# Patient Record
Sex: FEMALE | Race: WHITE | Employment: OTHER
[De-identification: names, ages, dates, MRNs, and addresses within clinical notes are randomized per-mention and may not be internally consistent; named-entity substitution may affect disease eponyms.]

---

## 2018-01-21 ENCOUNTER — HEALTH MAINTENANCE LETTER (OUTPATIENT)
Age: 58
End: 2018-01-21

## 2019-08-15 ENCOUNTER — TELEPHONE (OUTPATIENT)
Dept: DERMATOLOGY | Facility: CLINIC | Age: 59
End: 2019-08-15

## 2019-08-15 NOTE — TELEPHONE ENCOUNTER
Left message for patient advised to call [phone] for previsit questions upcoming appointment on 8/22/19 at 1:15pm.        1. Have you ever seen a dermatologist outside of the [facility] system or [place]?  If so, we need records  2. Any personal history of skin cancer?          Shilpa

## 2019-08-22 ENCOUNTER — OFFICE VISIT (OUTPATIENT)
Dept: PSYCHOLOGY | Facility: CLINIC | Age: 59
End: 2019-08-22

## 2019-08-22 ENCOUNTER — OFFICE VISIT (OUTPATIENT)
Dept: DERMATOLOGY | Facility: CLINIC | Age: 59
End: 2019-08-22
Payer: COMMERCIAL

## 2019-08-22 ENCOUNTER — PATIENT OUTREACH (OUTPATIENT)
Dept: CARE COORDINATION | Facility: CLINIC | Age: 59
End: 2019-08-22

## 2019-08-22 DIAGNOSIS — D23.9 DILATED PORE OF WINER: ICD-10-CM

## 2019-08-22 DIAGNOSIS — Z85.828 HISTORY OF NONMELANOMA SKIN CANCER: Primary | ICD-10-CM

## 2019-08-22 DIAGNOSIS — D22.9 RECURRENT NEVUS: ICD-10-CM

## 2019-08-22 DIAGNOSIS — L81.4 SOLAR LENTIGO: ICD-10-CM

## 2019-08-22 DIAGNOSIS — L57.8 SUN-DAMAGED SKIN: ICD-10-CM

## 2019-08-22 DIAGNOSIS — F43.20 ADJUSTMENT DISORDER: Primary | ICD-10-CM

## 2019-08-22 DIAGNOSIS — D18.01 CHERRY ANGIOMA: ICD-10-CM

## 2019-08-22 DIAGNOSIS — D22.9 MULTIPLE BENIGN NEVI: ICD-10-CM

## 2019-08-22 DIAGNOSIS — L82.1 SEBORRHEIC KERATOSIS: ICD-10-CM

## 2019-08-22 PROBLEM — G89.4 CHRONIC PAIN DISORDER: Status: ACTIVE | Noted: 2017-10-16

## 2019-08-22 PROBLEM — G56.01 CARPAL TUNNEL SYNDROME OF RIGHT WRIST: Status: ACTIVE | Noted: 2017-02-22

## 2019-08-22 PROBLEM — M18.11 PRIMARY OSTEOARTHRITIS OF FIRST CARPOMETACARPAL JOINT OF RIGHT HAND: Status: ACTIVE | Noted: 2017-02-06

## 2019-08-22 PROBLEM — I73.00 RAYNAUD'S DISEASE WITHOUT GANGRENE: Status: ACTIVE | Noted: 2017-02-06

## 2019-08-22 PROCEDURE — 99203 OFFICE O/P NEW LOW 30 MIN: CPT | Performed by: DERMATOLOGY

## 2019-08-22 PROCEDURE — 99207 ZZC NO CHARGE BEHAVIORAL WARM HANDOFF: CPT | Performed by: PSYCHOLOGIST

## 2019-08-22 RX ORDER — METHOCARBAMOL 750 MG/1
750 TABLET, FILM COATED ORAL
COMMUNITY
Start: 2019-07-28 | End: 2020-01-24

## 2019-08-22 ASSESSMENT — PAIN SCALES - GENERAL: PAINLEVEL: NO PAIN (0)

## 2019-08-22 NOTE — PROGRESS NOTES
Patient had appointment with her specialty provider, Dr. Wells. [facility] services were offered. No immediate safety/risk issues were reported or identified. Explained the role of the [facility] and provided contact information for the [facility]. Patient reported she was living in a shelter and expressed distress about finances, housing, and her medical health. She expressed feeling like she is not given help through the [facility] and is unsure how to proceed. She was connected with JASON Ewing to assist with evaluating possible resources.       Marcell Morejon PsyD,   Behavioral Health Clinician

## 2019-08-22 NOTE — PROGRESS NOTES
Social Work Intervention  [facility]    Data/Intervention: 19    Patient Name:  Nicole Franco  /Age:  1960 (58 year old)    Visit Type: in person  Referral Source: Marcell Morejon,   Reason for Referral:  Support with housing and financial    Collaborated With:    -Patient      Patient Concerns/Issues:   Writer connected with patient via referral from Behavioral Health following a dermatology appointment regarding concern with safety, housing and financial.     Met with Nicole following a dermatology appointment. Pt shared that she is currently staying at [facility] in [place].     Nicole shared that she has been staying at [facility] for almost two weeks and is in the process of attaining a restraining order. She has connected with the [facility] with the support of staff at the shelter and is receiving medical, financial and food support. Due to several medical diagnosis, Fibromyalgia, Depression and Anxiety she is not longer working. Nicole stated that she was a , however approximately four months ago closed out with her last client due to physical limitations. She stated that she has filed for SSDI within the last month and currently awaiting results.    Writer provided supportive listening and counseling. Patient is well connected with [facility] resources and ongoing support from [facility]. Provided writer contact information and encouraged her to connect with any additional concerns or questions. Sw will continue to assist as needed.               JASON Ewing, [facility]    [facility]  [phone]  [email or web address]
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
## 2019-08-22 NOTE — NURSING NOTE
Nicole Franco's goals for this visit include:   Chief Complaint   Patient presents with     Skin Check     Personal hx of SC, mother with hx of Melanoma and father NMSC. Not immunosuppressed. Areas of concern: mole on back.       She requests these members of her care team be copied on today's visit information: No    PCP: Yady Almazan    Referring Provider:  No referring provider defined for this encounter.    There were no vitals taken for this visit.    Do you need any medication refills at today's visit? No    Maryann Bay LPN

## 2019-08-22 NOTE — Clinical Note
LORI, I got her in touch with social work. Let me know if there is anything else I can assist with for this patient.Marcell
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
## 2019-09-30 ENCOUNTER — DOCUMENTATION ONLY (OUTPATIENT)
Dept: DERMATOLOGY | Facility: CLINIC | Age: 59
End: 2019-09-30

## 2019-09-30 NOTE — PROGRESS NOTES
Dermatology Problem List:  1. History of Dysplastic Nevus/Atypical Nevus  -Lentiginous junctional melanocytic lesion with mild atypia, right anterior neck,  - 2011, Exc. 6-6-2011  -Lentiginous junctional melanocytic lesion with moderate atypia, proximal to right medial ankle - 2011, clear margins with biopsy  -Junctional nevus with atypia (dysplastic), right lower chest - 2010  -Junctional nevus with atypia, right lateral proximal arm (dysplastic) - 2009  -Compound nevus with atypia (dysplastic), lef mid scapula - 2009  -Dysplastic nevus, medial to left scapula - 2006  -Compound nevus with atypia(dysplastic), upper spine left superior - 2006  -Dysplastic nevus, mild dysplasia, Below L Breast - 2004  -Junctional melanocytic lesion, L Mitchell - 2000  2. *Clinically monitor  - left mid abdomen, recurrent nevus within biopsy scar.   3. Family history of melanoma in her mother        Susana Mccormack, CMA

## 2020-01-24 ENCOUNTER — PATIENT OUTREACH (OUTPATIENT)
Dept: FAMILY MEDICINE | Facility: CLINIC | Age: 60
End: 2020-01-24

## 2020-01-24 ENCOUNTER — OFFICE VISIT (OUTPATIENT)
Dept: FAMILY MEDICINE | Facility: CLINIC | Age: 60
End: 2020-01-24
Payer: COMMERCIAL

## 2020-01-24 VITALS
OXYGEN SATURATION: 93 % | HEIGHT: 66 IN | SYSTOLIC BLOOD PRESSURE: 130 MMHG | WEIGHT: 164.1 LBS | TEMPERATURE: 96.6 F | RESPIRATION RATE: 18 BRPM | DIASTOLIC BLOOD PRESSURE: 74 MMHG | BODY MASS INDEX: 26.37 KG/M2 | HEART RATE: 87 BPM

## 2020-01-24 DIAGNOSIS — G89.29 OTHER CHRONIC PAIN: ICD-10-CM

## 2020-01-24 DIAGNOSIS — M79.7 FIBROMYALGIA: ICD-10-CM

## 2020-01-24 DIAGNOSIS — E78.5 HYPERLIPIDEMIA LDL GOAL <100: ICD-10-CM

## 2020-01-24 DIAGNOSIS — Z76.89 ENCOUNTER TO ESTABLISH CARE: Primary | ICD-10-CM

## 2020-01-24 DIAGNOSIS — F41.8 DEPRESSION WITH ANXIETY: ICD-10-CM

## 2020-01-24 DIAGNOSIS — F51.01 PRIMARY INSOMNIA: ICD-10-CM

## 2020-01-24 DIAGNOSIS — I10 ESSENTIAL HYPERTENSION: ICD-10-CM

## 2020-01-24 DIAGNOSIS — K21.9 GASTROESOPHAGEAL REFLUX DISEASE WITHOUT ESOPHAGITIS: ICD-10-CM

## 2020-01-24 DIAGNOSIS — Z12.11 SPECIAL SCREENING FOR MALIGNANT NEOPLASMS, COLON: ICD-10-CM

## 2020-01-24 DIAGNOSIS — Z59.00 HOMELESS: ICD-10-CM

## 2020-01-24 PROCEDURE — 99215 OFFICE O/P EST HI 40 MIN: CPT | Performed by: NURSE PRACTITIONER

## 2020-01-24 RX ORDER — SERTRALINE HYDROCHLORIDE 100 MG/1
150 TABLET, FILM COATED ORAL DAILY
Qty: 45 TABLET | Refills: 3 | Status: SHIPPED | OUTPATIENT
Start: 2020-01-24 | End: 2020-04-13

## 2020-01-24 RX ORDER — HYDROXYZINE HYDROCHLORIDE 25 MG/1
25 TABLET, FILM COATED ORAL DAILY
Qty: 30 TABLET | Refills: 3 | Status: SHIPPED | OUTPATIENT
Start: 2020-01-24 | End: 2020-04-13

## 2020-01-24 RX ORDER — METHOCARBAMOL 750 MG/1
750 TABLET, FILM COATED ORAL 3 TIMES DAILY
Qty: 90 TABLET | Refills: 3 | Status: SHIPPED | OUTPATIENT
Start: 2020-01-24 | End: 2020-04-13

## 2020-01-24 RX ORDER — LISINOPRIL AND HYDROCHLOROTHIAZIDE 20; 25 MG/1; MG/1
1 TABLET ORAL DAILY
Qty: 30 TABLET | Refills: 3 | Status: SHIPPED | OUTPATIENT
Start: 2020-01-24 | End: 2020-04-13

## 2020-01-24 RX ORDER — GEMFIBROZIL 600 MG/1
600 TABLET, FILM COATED ORAL
Qty: 60 TABLET | Refills: 3 | Status: SHIPPED | OUTPATIENT
Start: 2020-01-24 | End: 2020-04-13

## 2020-01-24 RX ORDER — TEMAZEPAM 30 MG
30 CAPSULE ORAL
Qty: 30 CAPSULE | Refills: 3 | Status: SHIPPED | OUTPATIENT
Start: 2020-01-24 | End: 2020-04-13

## 2020-01-24 RX ORDER — IBUPROFEN 600 MG/1
600 TABLET, FILM COATED ORAL 2 TIMES DAILY
Qty: 60 TABLET | Refills: 3 | Status: SHIPPED | OUTPATIENT
Start: 2020-01-24 | End: 2020-04-13

## 2020-01-24 RX ORDER — BUSPIRONE HYDROCHLORIDE 10 MG/1
10 TABLET ORAL 3 TIMES DAILY
Qty: 90 TABLET | Refills: 3 | Status: SHIPPED | OUTPATIENT
Start: 2020-01-24 | End: 2020-04-13

## 2020-01-24 RX ORDER — GABAPENTIN 300 MG/1
300 CAPSULE ORAL 2 TIMES DAILY
Qty: 60 CAPSULE | Refills: 3 | Status: SHIPPED | OUTPATIENT
Start: 2020-01-24 | End: 2020-04-07

## 2020-01-24 SDOH — ECONOMIC STABILITY - HOUSING INSECURITY: HOMELESSNESS UNSPECIFIED: Z59.00

## 2020-01-24 ASSESSMENT — PAIN SCALES - GENERAL: PAINLEVEL: SEVERE PAIN (6)

## 2020-01-24 ASSESSMENT — PATIENT HEALTH QUESTIONNAIRE - PHQ9
10. IF YOU CHECKED OFF ANY PROBLEMS, HOW DIFFICULT HAVE THESE PROBLEMS MADE IT FOR YOU TO DO YOUR WORK, TAKE CARE OF THINGS AT HOME, OR GET ALONG WITH OTHER PEOPLE: VERY DIFFICULT
SUM OF ALL RESPONSES TO PHQ QUESTIONS 1-9: 16
SUM OF ALL RESPONSES TO PHQ QUESTIONS 1-9: 16

## 2020-01-24 ASSESSMENT — MIFFLIN-ST. JEOR: SCORE: 1327.13

## 2020-01-24 NOTE — PATIENT INSTRUCTIONS
Refills now.     Care Coordination referral to look community resources and resources for medication.     See me in 2 months to reassess living situation and determine if she is ready for pain referral to our pain Team.     Odilon Fuentes CNP
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
## 2020-01-25 ASSESSMENT — PATIENT HEALTH QUESTIONNAIRE - PHQ9: SUM OF ALL RESPONSES TO PHQ QUESTIONS 1-9: 16

## 2020-01-27 ENCOUNTER — PATIENT OUTREACH (OUTPATIENT)
Dept: FAMILY MEDICINE | Facility: CLINIC | Age: 60
End: 2020-01-27

## 2020-01-27 NOTE — PROGRESS NOTES
The Clinic Community Health Worker spoke with  the patient today to discuss possible Clinic Care Coordination enrollment.  The service was described to the patient and immediate needs were discussed.  The patient agreed to enrollment and an assessment was scheduled.  The patient was provided with contact information for the clinic CHW.               Assessment date: 1/30/20 at 11am with MAKENNA SOLANO for a phone assessment.     Notes:  -Patient is in temporary home in [place]- looking for section 8 housing   -Try to get Social Security Disability  -Help paying for medications  -ARM's worker- getting a counselor to come to home

## 2020-01-27 NOTE — LETTER
1/27/20          Dear Nicole,                                                                         You were recently referred to the [facility]'s Clinic Care Coordination service.  This is a service offered through your Primary Care Clinic which can help you access resources, services in regard to your health and well-being goals. The clinic Community Health Worker has placed two calls to you to discuss the nature of this service and to offer enrollment.  If you are interested in learning more about Clinic Care Coordination, please call your Primary Care Clinic's Community Health Worker, Yen HERNANDEZ, at [phone].                                                    Sincerely,                                                                              PIOTR Barlow                                                                                          Clinic Care Coordination                                                  [facility]

## 2020-01-29 ENCOUNTER — TELEPHONE (OUTPATIENT)
Dept: FAMILY MEDICINE | Facility: CLINIC | Age: 60
End: 2020-01-29

## 2020-01-29 NOTE — TELEPHONE ENCOUNTER
Left message for patient to return call to schedule colonoscopy or EGD. If Disha or Melony are unavailable, please transfer to the surgery center.

## 2020-01-29 NOTE — LETTER
[address] [address]  [phone]        February 3, 2020    Nicole Franco  [id number] [address]  [place] [address]

## 2020-01-29 NOTE — LETTER

## 2020-01-30 ENCOUNTER — PATIENT OUTREACH (OUTPATIENT)
Dept: FAMILY MEDICINE | Facility: OTHER | Age: 60
End: 2020-01-30
Payer: COMMERCIAL

## 2020-01-30 DIAGNOSIS — Z71.89 COUNSELING AND COORDINATION OF CARE: Primary | ICD-10-CM

## 2020-01-30 ASSESSMENT — ACTIVITIES OF DAILY LIVING (ADL): DEPENDENT_IADLS:: INDEPENDENT
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
## 2020-01-31 NOTE — TELEPHONE ENCOUNTER
Left message for patient to return call to schedule EGD/colonoscopy. If Melony or Disha are not available, please transfer to same day surgery        .

## 2020-02-03 NOTE — TELEPHONE ENCOUNTER
Date of colonoscopy/EGD: 2/18/20  Surgeon: Dr. Louis  Prep:Miralax  Packet:Colonoscopy/EGD instructions mailed to patient's home address.   Date: 2/3/2020      Surgery Scheduler

## 2020-02-06 ENCOUNTER — PATIENT OUTREACH (OUTPATIENT)
Dept: CARE COORDINATION | Facility: CLINIC | Age: 60
End: 2020-02-06
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
## 2020-02-17 ENCOUNTER — ANESTHESIA EVENT (OUTPATIENT)
Dept: GASTROENTEROLOGY | Facility: CLINIC | Age: 60
End: 2020-02-17
Payer: COMMERCIAL

## 2020-02-17 ASSESSMENT — LIFESTYLE VARIABLES: TOBACCO_USE: 1

## 2020-02-18 ENCOUNTER — ANESTHESIA (OUTPATIENT)
Dept: GASTROENTEROLOGY | Facility: CLINIC | Age: 60
End: 2020-02-18
Payer: COMMERCIAL

## 2020-02-18 ENCOUNTER — HOSPITAL ENCOUNTER (OUTPATIENT)
Facility: CLINIC | Age: 60
Discharge: HOME | End: 2020-02-18
Attending: SURGERY | Admitting: SURGERY
Payer: COMMERCIAL

## 2020-02-18 VITALS
OXYGEN SATURATION: 99 % | HEART RATE: 66 BPM | RESPIRATION RATE: 16 BRPM | SYSTOLIC BLOOD PRESSURE: 123 MMHG | DIASTOLIC BLOOD PRESSURE: 86 MMHG | TEMPERATURE: 96.7 F

## 2020-02-18 LAB — COLONOSCOPY: NORMAL

## 2020-02-18 PROCEDURE — 25000132 ZZH RX MED GY IP 250 OP 250 PS 637: Performed by: SURGERY

## 2020-02-18 PROCEDURE — 25000128 H RX IP 250 OP 636: Performed by: NURSE ANESTHETIST, CERTIFIED REGISTERED

## 2020-02-18 PROCEDURE — 25000125 ZZHC RX 250: Performed by: SURGERY

## 2020-02-18 PROCEDURE — 37000009 ZZH ANESTHESIA TECHNICAL FEE, EACH ADDTL 15 MIN: Performed by: SURGERY

## 2020-02-18 PROCEDURE — G0121 COLON CA SCRN NOT HI RSK IND: HCPCS | Performed by: SURGERY

## 2020-02-18 PROCEDURE — 25800030 ZZH RX IP 258 OP 636: Performed by: SURGERY

## 2020-02-18 PROCEDURE — 25000125 ZZHC RX 250: Performed by: NURSE ANESTHETIST, CERTIFIED REGISTERED

## 2020-02-18 PROCEDURE — 37000008 ZZH ANESTHESIA TECHNICAL FEE, 1ST 30 MIN: Performed by: SURGERY

## 2020-02-18 PROCEDURE — 45378 DIAGNOSTIC COLONOSCOPY: CPT | Performed by: SURGERY

## 2020-02-18 PROCEDURE — G0105 COLORECTAL SCRN; HI RISK IND: HCPCS | Performed by: SURGERY

## 2020-02-18 RX ORDER — ONDANSETRON 2 MG/ML
4 INJECTION INTRAMUSCULAR; INTRAVENOUS EVERY 6 HOURS PRN
Status: CANCELLED | OUTPATIENT
Start: 2020-02-18

## 2020-02-18 RX ORDER — PROPOFOL 10 MG/ML
INJECTION, EMULSION INTRAVENOUS PRN
Status: DISCONTINUED | OUTPATIENT
Start: 2020-02-18 | End: 2020-02-18

## 2020-02-18 RX ORDER — LIDOCAINE HYDROCHLORIDE 20 MG/ML
INJECTION, SOLUTION INFILTRATION; PERINEURAL PRN
Status: DISCONTINUED | OUTPATIENT
Start: 2020-02-18 | End: 2020-02-18

## 2020-02-18 RX ORDER — ONDANSETRON 4 MG/1
4 TABLET, ORALLY DISINTEGRATING ORAL EVERY 6 HOURS PRN
Status: CANCELLED | OUTPATIENT
Start: 2020-02-18

## 2020-02-18 RX ORDER — SODIUM CHLORIDE, SODIUM LACTATE, POTASSIUM CHLORIDE, CALCIUM CHLORIDE 600; 310; 30; 20 MG/100ML; MG/100ML; MG/100ML; MG/100ML
INJECTION, SOLUTION INTRAVENOUS CONTINUOUS
Status: DISCONTINUED | OUTPATIENT
Start: 2020-02-18 | End: 2020-02-18 | Stop reason: HOSPADM

## 2020-02-18 RX ORDER — NALOXONE HYDROCHLORIDE 0.4 MG/ML
.1-.4 INJECTION, SOLUTION INTRAMUSCULAR; INTRAVENOUS; SUBCUTANEOUS
Status: CANCELLED | OUTPATIENT
Start: 2020-02-18 | End: 2020-02-19

## 2020-02-18 RX ORDER — ONDANSETRON 2 MG/ML
4 INJECTION INTRAMUSCULAR; INTRAVENOUS
Status: DISCONTINUED | OUTPATIENT
Start: 2020-02-18 | End: 2020-02-18 | Stop reason: HOSPADM

## 2020-02-18 RX ORDER — PROPOFOL 10 MG/ML
INJECTION, EMULSION INTRAVENOUS CONTINUOUS PRN
Status: DISCONTINUED | OUTPATIENT
Start: 2020-02-18 | End: 2020-02-18

## 2020-02-18 RX ORDER — FLUMAZENIL 0.1 MG/ML
0.2 INJECTION, SOLUTION INTRAVENOUS
Status: CANCELLED | OUTPATIENT
Start: 2020-02-18 | End: 2020-02-18

## 2020-02-18 RX ORDER — LIDOCAINE 40 MG/G
CREAM TOPICAL
Status: DISCONTINUED | OUTPATIENT
Start: 2020-02-18 | End: 2020-02-18 | Stop reason: HOSPADM

## 2020-02-18 RX ADMIN — LIDOCAINE HYDROCHLORIDE 60 MG: 20 INJECTION, SOLUTION INFILTRATION; PERINEURAL at 11:04

## 2020-02-18 RX ADMIN — SODIUM CHLORIDE, POTASSIUM CHLORIDE, SODIUM LACTATE AND CALCIUM CHLORIDE: 600; 310; 30; 20 INJECTION, SOLUTION INTRAVENOUS at 10:52

## 2020-02-18 RX ADMIN — PROPOFOL 100 MG: 10 INJECTION, EMULSION INTRAVENOUS at 11:04

## 2020-02-18 RX ADMIN — LIDOCAINE HYDROCHLORIDE 1 ML: 10 INJECTION, SOLUTION EPIDURAL; INFILTRATION; INTRACAUDAL; PERINEURAL at 10:44

## 2020-02-18 RX ADMIN — PROPOFOL 150 MCG/KG/MIN: 10 INJECTION, EMULSION INTRAVENOUS at 11:04

## 2020-02-18 NOTE — ANESTHESIA CARE TRANSFER NOTE
Patient: Nicole Franco    Procedure(s):  Colonoscopy    Diagnosis: Special screening for malignant neoplasms, colon [Z12.11]  Diagnosis Additional Information: No value filed.    Anesthesia Type:   MAC     Note:  Airway :Room Air  Patient transferred to:Phase II  Handoff Report: Identifed the Patient, Identified the Reponsible Provider, Reviewed the pertinent medical history, Discussed the surgical course, Reviewed Intra-OP anesthesia mangement and issues during anesthesia, Set expectations for post-procedure period and Allowed opportunity for questions and acknowledgement of understanding      Vitals: (Last set prior to Anesthesia Care Transfer)    CRNA VITALS  2/18/2020 1056 - 2/18/2020 1132      2/18/2020             Resp Rate (observed):  (!) 5                Electronically Signed By: BULL Hernandez CRNA  February 18, 2020  11:32 AM

## 2020-02-18 NOTE — ANESTHESIA PREPROCEDURE EVALUATION
Anesthesia Pre-Procedure Evaluation    Patient: Nicole Franco   MRN: [id number] : 1960          Preoperative Diagnosis: Special screening for malignant neoplasms, colon [Z12.11]    Procedure(s):  Colonoscopy with possible biopsy and/or polypectomy    Past Medical History:   Diagnosis Date     Anxiety      Breast CA (H)      Hypertension      Skin cancer      Past Surgical History:   Procedure Laterality Date     APPENDECTOMY       MASTECTOMY RADICAL         Anesthesia Evaluation     . Pt has not had prior anesthetic            ROS/MED HX    ENT/Pulmonary:     (+)tobacco use, Current use , . .    Neurologic:  - neg neurologic ROS     Cardiovascular:     (+) hypertension----. : . . . :. . No previous cardiac testing       METS/Exercise Tolerance:     Hematologic:  - neg hematologic  ROS       Musculoskeletal:   (+)  other musculoskeletal- fibromyalgia      GI/Hepatic:     (+) GERD Symptomatic,       Renal/Genitourinary:  - ROS Renal section negative       Endo:  - neg endo ROS       Psychiatric:     (+) psychiatric history anxiety and depression      Infectious Disease:  - neg infectious disease ROS       Malignancy:   (+) Malignancy History of Breast  Breast CA Remission status post Surgery.         Other:    (+) C-spine cleared: N/A, H/O Chronic Pain,                        Physical Exam  Normal systems: cardiovascular and pulmonary    Airway   Mallampati: II  TM distance: >3 FB  Neck ROM: full    Dental     Cardiovascular   Rhythm and rate: regular and normal      Pulmonary    breath sounds clear to auscultation            Lab Results   Component Value Date    WBC 10.5 2015    HGB 15.4 2015    HCT 43.9 2015     2015    CRP <5.0 2011    SED 20 10/08/2012     2015    POTASSIUM 3.8 2015    CHLORIDE 101 2015    CO2 29 2015    BUN 13 2015    CR 0.69 2015     (H) 2015    HEIDI 9.7 2015    TSH 1.47 2011  "      Preop Vitals  BP Readings from Last 3 Encounters:   01/24/20 130/74   04/01/16 (!) 143/100   03/27/16 (!) 153/91    Pulse Readings from Last 3 Encounters:   01/24/20 87   04/01/16 61   03/27/16 72      Resp Readings from Last 3 Encounters:   01/24/20 18   04/01/16 18   03/27/16 18    SpO2 Readings from Last 3 Encounters:   01/24/20 93%   04/01/16 97%   03/27/16 97%      Temp Readings from Last 1 Encounters:   01/24/20 96.6  F (35.9  C) (Temporal)    Ht Readings from Last 1 Encounters:   01/24/20 1.67 m (5' 5.75\")      Wt Readings from Last 1 Encounters:   01/24/20 74.4 kg (164 lb 1.6 oz)    Estimated body mass index is 26.69 kg/m  as calculated from the following:    Height as of 1/24/20: 1.67 m (5' 5.75\").    Weight as of 1/24/20: 74.4 kg (164 lb 1.6 oz).       Anesthesia Plan      History & Physical Review  History and physical reviewed and following examination; no interval change.    ASA Status:  2 .    NPO Status:  > 8 hours    Plan for MAC Reason for MAC:  Difficulty with conscious sedation (QS)         Postoperative Care      Consents  Anesthetic plan, risks, benefits and alternatives discussed with:  Patient.  Use of blood products discussed: No .   .                 BULL Klein CRNA  "

## 2020-02-18 NOTE — DISCHARGE INSTRUCTIONS
[facility]    Home Care Following Endoscopy          Activity:    You have just undergone an endoscopic procedure usually performed with conscious sedation.  Do not work or operate machinery (including a car) for at least 12 hours.      I encourage you to walk and attempt to pass this air as soon as possible.    Diet:    Return to the diet you were on before your procedure but eat lightly for the first 12-24 hours.    Drink plenty of water.    Resume any regular medications unless otherwise advised by your physician.  Please begin any new medication prescribed as a result of your procedure as directed by your physician.     If you had any biopsy or polyp removed please refrain from aspirin or aspirin products for 2 days.  If on Coumadin please restart as instructed by your physician.   Pain:    You may take Tylenol as needed for pain.  Expected Recovery:    You can expect some mild abdominal fullness and/or discomfort due to the air used to inflate your intestinal tract. It is also normal to have a mild sore throat after upper endoscopy.    Call Your Physician if You Have:    After Upper Endoscopy:  o Shoulder, back or chest pain.  o Difficulty breathing or swallowing.  o Vomiting blood.    After Colonoscopy:  o Worsening persisting abdominal pain which is worse with activity.  o Fevers (>101 degrees F), chills or shakes.  o Passage of continued blood with bowel movements.   Any questions or concerns about your recovery, please call [phone] or after hours [phone] Nurse Advice Line.    Follow-up Care:    You should receive a call or letter with your results within 1 week. Please call if you have not received a notification of your results.  If asked to return to clinic please make an appointment 1 week after your procedure.  Call [phone].

## 2020-02-18 NOTE — ANESTHESIA POSTPROCEDURE EVALUATION
Patient: Nicole Franco    Procedure(s):  Colonoscopy    Diagnosis:Special screening for malignant neoplasms, colon [Z12.11]  Diagnosis Additional Information: No value filed.    Anesthesia Type:  MAC    Note:  Anesthesia Post Evaluation    Patient location during evaluation: Phase 2 and Bedside  Patient participation: Able to fully participate in evaluation  Level of consciousness: awake and alert  Pain management: satisfactory to patient  Airway patency: patent  Cardiovascular status: stable  Respiratory status: spontaneous ventilation and room air  Hydration status: stable  PONV: none     Anesthetic complications: None    Comments: Appear to tolerate MAC with IV sedation well without anesthesia related problems / complications noted.  Pain level satisfactory per patient. No N  /  V .  No complaints per patient.  Will follow as needed.        Last vitals:  Vitals:    02/18/20 1033   BP: (!) 148/79   Resp: 16   Temp: 96.7  F (35.9  C)   SpO2: 99%         Electronically Signed By: BULL Hernandez CRNA  February 18, 2020  11:33 AM

## 2020-03-27 ENCOUNTER — TELEPHONE (OUTPATIENT)
Dept: FAMILY MEDICINE | Facility: CLINIC | Age: 60
End: 2020-03-27

## 2020-03-27 DIAGNOSIS — M50.30 DDD (DEGENERATIVE DISC DISEASE), CERVICAL: Primary | ICD-10-CM

## 2020-03-27 NOTE — TELEPHONE ENCOUNTER
Reason for call:  Other   Patient called regarding (reason for call): call back  Additional comments: Patient is looking to discuss a prescription for a walker    Phone number to reach patient:  Home number on file [phone] (home)    Best Time:  any    Can we leave a detailed message on this number?  YES    Travel screening: Negative

## 2020-03-30 ENCOUNTER — TELEPHONE (OUTPATIENT)
Dept: FAMILY MEDICINE | Facility: CLINIC | Age: 60
End: 2020-03-30

## 2020-03-30 DIAGNOSIS — M50.30 DDD (DEGENERATIVE DISC DISEASE), CERVICAL: ICD-10-CM

## 2020-03-30 NOTE — TELEPHONE ENCOUNTER
Order updated for 4 wheeled walker w/seat & brakes.    Needs to signed and faxed to  [facility] [phone].      Gage Carrizales [facility], [facility] [phone]

## 2020-04-07 DIAGNOSIS — G89.29 OTHER CHRONIC PAIN: ICD-10-CM

## 2020-04-07 RX ORDER — GABAPENTIN 300 MG/1
300 CAPSULE ORAL 2 TIMES DAILY
Qty: 60 CAPSULE | Refills: 3 | Status: SHIPPED | OUTPATIENT
Start: 2020-04-07 | End: 2020-04-13

## 2020-04-07 NOTE — TELEPHONE ENCOUNTER
Reason for call:  Medication   If this is a refill request, has the caller requested the refill from the pharmacy already? Yes  Will the patient be using a [facility]? N/A  Name of the pharmacy and phone number for the current request:    [id number] - [place], [place] - [address]      Name of the medication requested:      gabapentin (NEURONTIN) 300 MG capsule ()         Other request: Patient says this prescription is supposed to be doubled the MG amount. They're unsure why the dosage changed. They would like a refill on this because they are currently low.     Phone number to reach patient:  Cell number on file:    Telephone Information:   Mobile [phone]           Best Time:  Late morning, not     Can we leave a detailed message on this number? YES    Travel screening: Negative

## 2020-04-13 ENCOUNTER — VIRTUAL VISIT (OUTPATIENT)
Dept: FAMILY MEDICINE | Facility: CLINIC | Age: 60
End: 2020-04-13
Payer: COMMERCIAL

## 2020-04-13 DIAGNOSIS — G89.29 OTHER CHRONIC PAIN: ICD-10-CM

## 2020-04-13 DIAGNOSIS — K21.9 GASTROESOPHAGEAL REFLUX DISEASE WITHOUT ESOPHAGITIS: ICD-10-CM

## 2020-04-13 DIAGNOSIS — N39.41 URGENCY INCONTINENCE: ICD-10-CM

## 2020-04-13 DIAGNOSIS — F51.01 PRIMARY INSOMNIA: ICD-10-CM

## 2020-04-13 DIAGNOSIS — I10 ESSENTIAL HYPERTENSION: ICD-10-CM

## 2020-04-13 DIAGNOSIS — M79.7 FIBROMYALGIA: Primary | ICD-10-CM

## 2020-04-13 DIAGNOSIS — F41.8 DEPRESSION WITH ANXIETY: ICD-10-CM

## 2020-04-13 DIAGNOSIS — E78.5 HYPERLIPIDEMIA LDL GOAL <100: ICD-10-CM

## 2020-04-13 PROCEDURE — 99442 ZZC PHYSICIAN TELEPHONE EVALUATION 11-20 MIN: CPT | Performed by: NURSE PRACTITIONER

## 2020-04-13 RX ORDER — METHOCARBAMOL 750 MG/1
750 TABLET, FILM COATED ORAL 3 TIMES DAILY
Qty: 90 TABLET | Refills: 3 | Status: SHIPPED | OUTPATIENT
Start: 2020-04-13 | End: 2020-12-31

## 2020-04-13 RX ORDER — TEMAZEPAM 30 MG
30 CAPSULE ORAL
Qty: 30 CAPSULE | Refills: 3 | Status: SHIPPED | OUTPATIENT
Start: 2020-04-13 | End: 2020-07-31

## 2020-04-13 RX ORDER — SERTRALINE HYDROCHLORIDE 100 MG/1
150 TABLET, FILM COATED ORAL DAILY
Qty: 45 TABLET | Refills: 3 | Status: SHIPPED | OUTPATIENT
Start: 2020-04-13 | End: 2020-09-25

## 2020-04-13 RX ORDER — OXYBUTYNIN CHLORIDE 5 MG/1
10 TABLET ORAL 2 TIMES DAILY
Qty: 360 TABLET | Refills: 0 | Status: SHIPPED | OUTPATIENT
Start: 2020-04-13 | End: 2020-07-06

## 2020-04-13 RX ORDER — GABAPENTIN 300 MG/1
1200 CAPSULE ORAL 2 TIMES DAILY
Qty: 720 CAPSULE | Refills: 0 | Status: SHIPPED | OUTPATIENT
Start: 2020-04-13 | End: 2020-07-29

## 2020-04-13 RX ORDER — IBUPROFEN 600 MG/1
600 TABLET, FILM COATED ORAL 2 TIMES DAILY
Qty: 60 TABLET | Refills: 3 | Status: SHIPPED | OUTPATIENT
Start: 2020-04-13 | End: 2021-01-20

## 2020-04-13 RX ORDER — HYDROXYZINE HYDROCHLORIDE 25 MG/1
25 TABLET, FILM COATED ORAL DAILY
Qty: 30 TABLET | Refills: 3 | Status: SHIPPED | OUTPATIENT
Start: 2020-04-13 | End: 2020-06-26

## 2020-04-13 RX ORDER — GEMFIBROZIL 600 MG/1
600 TABLET, FILM COATED ORAL
Qty: 60 TABLET | Refills: 3 | Status: SHIPPED | OUTPATIENT
Start: 2020-04-13 | End: 2021-01-13

## 2020-04-13 RX ORDER — BUSPIRONE HYDROCHLORIDE 10 MG/1
10 TABLET ORAL 3 TIMES DAILY
Qty: 90 TABLET | Refills: 3 | Status: SHIPPED | OUTPATIENT
Start: 2020-04-13 | End: 2020-12-22

## 2020-04-13 RX ORDER — LISINOPRIL AND HYDROCHLOROTHIAZIDE 20; 25 MG/1; MG/1
1 TABLET ORAL DAILY
Qty: 30 TABLET | Refills: 3 | Status: SHIPPED | OUTPATIENT
Start: 2020-04-13 | End: 2020-10-21

## 2020-04-13 ASSESSMENT — PAIN SCALES - GENERAL: PAINLEVEL: NO PAIN (0)

## 2020-04-13 NOTE — PROGRESS NOTES
"Nicole Franco is a 59 year old female who is being evaluated via a billable telephone visit.      The patient has been notified of following:     \"This telephone visit will be conducted via a call between you and your physician/provider. We have found that certain health care needs can be provided without the need for a physical exam.  This service lets us provide the care you need with a short phone conversation.  If a prescription is necessary we can send it directly to your pharmacy.  If lab work is needed we can place an order for that and you can then stop by our lab to have the test done at a later time.    Telephone visits are billed at different rates depending on your insurance coverage. During this emergency period, for some insurers they may be billed the same as an in-person visit.  Please reach out to your insurance provider with any questions.    If during the course of the call the physician/provider feels a telephone visit is not appropriate, you will not be charged for this service.\"    Patient has given verbal consent for Telephone visit?  Yes    How would you like to obtain your AVS? Mail a copy    Subjective     Nicole Franco is a 59 year old female who presents to clinic today for the following health issues:    Medication Followup of Gabapentin     Taking Medication as prescribed: yes    Side Effects:  None    Medication Helping Symptoms:  yes              Patient Active Problem List   Diagnosis     Abnormal Pap smear of cervix     Anxiety state     Depression with anxiety     Chronic pain disorder     Fibromyalgia     Carpal tunnel syndrome of right wrist     Decreased libido     Degeneration of intervertebral disc     DUB (dysfunctional uterine bleeding)     Dysplastic nevus     Dysthymic disorder     Essential hypertension     Excessive or frequent menstruation     Gastroesophageal reflux disease     Hemorrhoids     Varicose vein of leg     Insomnia     Malignant neoplasm of breast " (H)     Personal history of malignant neoplasm of breast     Primary osteoarthritis of first carpometacarpal joint of right hand     Raynaud's disease without gangrene     Menopause present     Tobacco use disorder     Past Surgical History:   Procedure Laterality Date     APPENDECTOMY       COLONOSCOPY N/A 2/18/2020    Procedure: Colonoscopy;  Surgeon: Dominic Louis DO;  Location: [facility]     MASTECTOMY RADICAL         Social History     Tobacco Use     Smoking status: Current Every Day Smoker     Packs/day: 0.50     Smokeless tobacco: Never Used   Substance Use Topics     Alcohol use: Yes     Alcohol/week: 1.7 standard drinks     Types: 2 Shots of liquor per week     Comment: occ     No family history on file.      Current Outpatient Medications   Medication Sig Dispense Refill     ascorbic acid (VITAMIN C) 500 MG tablet Take 500 mg by mouth daily.       B Complex Vitamins (VITAMIN B COMPLEX PO) B 12       busPIRone (BUSPAR) 10 MG tablet Take 1 tablet (10 mg) by mouth 3 times daily 90 tablet 3     Calcium Carbonate 650 MG TABS Take  by mouth.       cholecalciferol (VITAMIN D) 1000 UNIT tablet Take 1 tablet by mouth daily.       fish oil-omega-3 fatty acids (FISH OIL) 1000 MG capsule Take 1 g by mouth daily.       Flaxseed, Linseed, 1200 MG CAPS Take  by mouth.       gabapentin (NEURONTIN) 300 MG capsule Take 4 capsules (1,200 mg) by mouth 2 times daily 720 capsule 0     gemfibrozil (LOPID) 600 MG tablet Take 1 tablet (600 mg) by mouth 2 times daily (before meals) 60 tablet 3     hydrOXYzine (ATARAX) 25 MG tablet Take 1 tablet (25 mg) by mouth daily 30 tablet 3     ibuprofen (ADVIL/MOTRIN) 600 MG tablet Take 1 tablet (600 mg) by mouth 2 times daily 60 tablet 3     lisinopril-hydrochlorothiazide (ZESTORETIC) 20-25 MG tablet Take 1 tablet by mouth daily 30 tablet 3     methocarbamol (ROBAXIN) 750 MG tablet Take 1 tablet (750 mg) by mouth 3 times daily 90 tablet 3     Multiple Vitamin (MULTI-VITAMIN) per  tablet Take 1 tablet by mouth daily.       omeprazole (PRILOSEC) 20 MG DR capsule Take 1 capsule (20 mg) by mouth daily 30 capsule 3     order for DME Equipment being ordered: 4 wheeled walker w/seat & brakes - please fax to [facility] [phone] 1 Device 0     oxybutynin (DITROPAN) 5 MG tablet Take 2 tablets (10 mg) by mouth 2 times daily 360 tablet 0     PHENAZOPYRIDINE HCL PO Take 1 tablet by mouth       sertraline (ZOLOFT) 100 MG tablet Take 1.5 tablets (150 mg) by mouth daily 45 tablet 3     temazepam (RESTORIL) 30 MG capsule Take 1 capsule (30 mg) by mouth nightly as needed for sleep 30 capsule 3     vitamin E (TOCOPHEROL) 100 UNIT capsule Take 100 Units by mouth daily.       Allergies   Allergen Reactions     Amoxicillin      Aspirin      Mild rash     Cephalexin Hives     Flagyl [Metronidazole]      severe hives     Sulfa Drugs Hives     edema     Recent Labs   Lab Test 11/06/15  1426 10/08/12  1418   CR 0.69 0.64   GFRESTIMATED 87 >90   GFRESTBLACK >90   GFR Calc   >90   POTASSIUM 3.8 4.1      BP Readings from Last 3 Encounters:   02/18/20 123/86   01/24/20 130/74   04/01/16 (!) 143/100    Wt Readings from Last 3 Encounters:   01/24/20 74.4 kg (164 lb 1.6 oz)   04/01/16 68 kg (150 lb)   03/27/16 68 kg (150 lb)                    Reviewed and updated as needed this visit by Provider         Review of Systems   ROS COMP: Constitutional, HEENT, cardiovascular, pulmonary, gi and gu systems are negative, except as otherwise noted.       Objective   Reported vitals:  There were no vitals taken for this visit.   EXAM: Deferred, telephone visit, secondary to [place] VID 19 restrictions.  PSYCH: Alert and oriented times 3; coherent speech, normal   rate and volume, able to articulate logical thoughts, able   to abstract reason, no tangential thoughts, no hallucinations   or delusions  Her affect is normal and pleasant  RESP: No cough, no audible wheezing, able to talk in full  sentences  Remainder of exam unable to be completed due to telephone visits    Diagnostic Test Results:  Labs reviewed in Epic        Assessment/Plan:  1. Fibromyalgia  -Stable with the Robaxin and the use of the gabapentin.  She is much more active now that she has her walker and is grateful for the added stability to the walker provides her, when weather allows she is outside walking 1-2 times daily.  This helps her retain the strength that she does have and improves the muscle pain and joint pain.  - methocarbamol (ROBAXIN) 750 MG tablet; Take 1 tablet (750 mg) by mouth 3 times daily  Dispense: 90 tablet; Refill: 3    2. Depression with anxiety  -She is having more episodes of anxiety with the current isolation secondary to the restrictions we are all under, she does not want to increase her medications at this time she is working with a therapist 1 time daily over the phone but does need a refill on her medications so we will get that for her today.  She does know to call clinic if she feels as though she needs an increase in medications, she also knows to present to ER with severe anxiety or issues with depression, that would cause her to monitor herself or others.  - sertraline (ZOLOFT) 100 MG tablet; Take 1.5 tablets (150 mg) by mouth daily  Dispense: 45 tablet; Refill: 3  - hydrOXYzine (ATARAX) 25 MG tablet; Take 1 tablet (25 mg) by mouth daily  Dispense: 30 tablet; Refill: 3  - busPIRone (BUSPAR) 10 MG tablet; Take 1 tablet (10 mg) by mouth 3 times daily  Dispense: 90 tablet; Refill: 3    3. Hyperlipidemia LDL goal <100  -Lipids have been fairly well controlled on the Lopid she is taking this consistently but she does need a refill we will get her refill today.  Labs will be a wrist reassessed per health maintenance requirements.  - gemfibrozil (LOPID) 600 MG tablet; Take 1 tablet (600 mg) by mouth 2 times daily (before meals)  Dispense: 60 tablet; Refill: 3    4. Other chronic pain  -We will continue with  the Neurontin and Advil for now she is aware to take the Advil with food, the chronic pain is stable, and as stated above improved with the increased activity with walking secondary to the use of the walker now.  - gabapentin (NEURONTIN) 300 MG capsule; Take 4 capsules (1,200 mg) by mouth 2 times daily  Dispense: 720 capsule; Refill: 0  - ibuprofen (ADVIL/MOTRIN) 600 MG tablet; Take 1 tablet (600 mg) by mouth 2 times daily  Dispense: 60 tablet; Refill: 3    5. Urgency incontinence  -Continues to have issues with urgency and stress incontinence, Ditropan does help but she is completely out so I will get a refill for this.  - oxybutynin (DITROPAN) 5 MG tablet; Take 2 tablets (10 mg) by mouth 2 times daily  Dispense: 360 tablet; Refill: 0    6. Essential hypertension  -Blood pressures well within goal range we will continue with current plan of care no new side effects or hypotension related to the medication that she is on.  - lisinopril-hydrochlorothiazide (ZESTORETIC) 20-25 MG tablet; Take 1 tablet by mouth daily  Dispense: 30 tablet; Refill: 3    7. Gastroesophageal reflux disease without esophagitis  -Reflux well controlled on the Prilosec we will continue on current plan of care.  She has failed when we tried to take her off this in the past.  - omeprazole (PRILOSEC) 20 MG DR capsule; Take 1 capsule (20 mg) by mouth daily  Dispense: 30 capsule; Refill: 3    8. Primary insomnia  -Well-controlled with the Restoril but she does need a refill we will continue with current plan of care  - temazepam (RESTORIL) 30 MG capsule; Take 1 capsule (30 mg) by mouth nightly as needed for sleep  Dispense: 30 capsule; Refill: 3    Patient instructed to call clinic with new or worsening symptoms prior to her next follow up appointment.     Patient verbalized understanding of plan of care and is in agreement with current plan of care.  Phone call duration:  14 minutes    Odilon Fuentes NP

## 2020-04-13 NOTE — PATIENT INSTRUCTIONS
-All medications have been refilled.    -Plan for clinic visit within the next 3 months with repeat labs as needed per health maintenance needs.      -Call clinic with any new or worsening symptoms prior to your follow-up appointment.    Odilon Fuentes, CNP

## 2020-06-11 DIAGNOSIS — N39.41 URGENCY INCONTINENCE: ICD-10-CM

## 2020-06-12 RX ORDER — OXYBUTYNIN CHLORIDE 5 MG/1
TABLET ORAL
Qty: 360 TABLET | Refills: 0 | OUTPATIENT
Start: 2020-06-12

## 2020-06-26 ENCOUNTER — VIRTUAL VISIT (OUTPATIENT)
Dept: FAMILY MEDICINE | Facility: CLINIC | Age: 60
End: 2020-06-26
Payer: COMMERCIAL

## 2020-06-26 DIAGNOSIS — M54.16 LUMBAR RADICULOPATHY: ICD-10-CM

## 2020-06-26 DIAGNOSIS — M79.644 CHRONIC PAIN OF RIGHT THUMB: ICD-10-CM

## 2020-06-26 DIAGNOSIS — F41.8 DEPRESSION WITH ANXIETY: ICD-10-CM

## 2020-06-26 DIAGNOSIS — E78.5 HYPERLIPIDEMIA LDL GOAL <100: Primary | ICD-10-CM

## 2020-06-26 DIAGNOSIS — G89.29 CHRONIC PAIN OF RIGHT THUMB: ICD-10-CM

## 2020-06-26 PROCEDURE — 99214 OFFICE O/P EST MOD 30 MIN: CPT | Mod: 95 | Performed by: NURSE PRACTITIONER

## 2020-06-26 RX ORDER — HYDROXYZINE HYDROCHLORIDE 25 MG/1
50 TABLET, FILM COATED ORAL EVERY 6 HOURS PRN
Qty: 60 TABLET | Refills: 3 | Status: SHIPPED | OUTPATIENT
Start: 2020-06-26 | End: 2020-10-21

## 2020-06-26 NOTE — PATIENT INSTRUCTIONS
- Referral to sports medicine for chronic right thumb pain related to arthritis.    - Referral to [facility] clinic in [place] for chronic Lumbar pain and left sided radiculopathy.     Odilon Fuentes CNP

## 2020-06-26 NOTE — PROGRESS NOTES
"Nicole Franco is a 59 year old female who is being evaluated via a billable telephone visit.      The patient has been notified of following:     \"This telephone visit will be conducted via a call between you and your physician/provider. We have found that certain health care needs can be provided without the need for a physical exam.  This service lets us provide the care you need with a short phone conversation.  If a prescription is necessary we can send it directly to your pharmacy.  If lab work is needed we can place an order for that and you can then stop by our lab to have the test done at a later time.    Telephone visits are billed at different rates depending on your insurance coverage. During this emergency period, for some insurers they may be billed the same as an in-person visit.  Please reach out to your insurance provider with any questions.    If during the course of the call the physician/provider feels a telephone visit is not appropriate, you will not be charged for this service.\"    Patient has given verbal consent for Telephone visit?  Yes    What phone number would you like to be contacted at? [phone]    How would you like to obtain your AVS? Mail a copy    Subjective     Nicole Franco is a 59 year old female who presents via phone visit today for the following health issues:    HPI     Patient needs to see spine and needs a referral for spine block. Also looking for a referral for cortisone injection. Patient states yesterday she thinks she broke her toe. Wondering about x-ray and needing a boot. What like to go over medications with you.     Most refills will be up in July wondering if you can send those now.           Patient Active Problem List   Diagnosis     Abnormal Pap smear of cervix     Anxiety state     Depression with anxiety     Chronic pain disorder     Fibromyalgia     Carpal tunnel syndrome of right wrist     Decreased libido     Degeneration of intervertebral disc "     DUB (dysfunctional uterine bleeding)     Dysplastic nevus     Dysthymic disorder     Essential hypertension     Excessive or frequent menstruation     Gastroesophageal reflux disease     Hemorrhoids     Varicose vein of leg     Insomnia     Malignant neoplasm of breast (H)     Personal history of malignant neoplasm of breast     Primary osteoarthritis of first carpometacarpal joint of right hand     Raynaud's disease without gangrene     Menopause present     Tobacco use disorder     Past Surgical History:   Procedure Laterality Date     APPENDECTOMY       COLONOSCOPY N/A 2/18/2020    Procedure: Colonoscopy;  Surgeon: Dominic Louis DO;  Location: [facility]     MASTECTOMY RADICAL         Social History     Tobacco Use     Smoking status: Current Every Day Smoker     Packs/day: 0.50     Smokeless tobacco: Never Used   Substance Use Topics     Alcohol use: Yes     Alcohol/week: 1.7 standard drinks     Types: 2 Shots of liquor per week     Comment: occ     History reviewed. No pertinent family history.      Current Outpatient Medications   Medication Sig Dispense Refill     ascorbic acid (VITAMIN C) 500 MG tablet Take 500 mg by mouth daily.       B Complex Vitamins (VITAMIN B COMPLEX PO) B 12       busPIRone (BUSPAR) 10 MG tablet Take 1 tablet (10 mg) by mouth 3 times daily 90 tablet 3     Calcium Carbonate 650 MG TABS Take  by mouth.       cholecalciferol (VITAMIN D) 1000 UNIT tablet Take 1 tablet by mouth daily.       fish oil-omega-3 fatty acids (FISH OIL) 1000 MG capsule Take 1 g by mouth daily.       Flaxseed, Linseed, 1200 MG CAPS Take  by mouth.       gabapentin (NEURONTIN) 300 MG capsule Take 4 capsules (1,200 mg) by mouth 2 times daily 720 capsule 0     gemfibrozil (LOPID) 600 MG tablet Take 1 tablet (600 mg) by mouth 2 times daily (before meals) 60 tablet 3     hydrOXYzine (ATARAX) 25 MG tablet Take 2 tablets (50 mg) by mouth every 6 hours as needed for itching 60 tablet 3     ibuprofen  (ADVIL/MOTRIN) 600 MG tablet Take 1 tablet (600 mg) by mouth 2 times daily 60 tablet 3     lisinopril-hydrochlorothiazide (ZESTORETIC) 20-25 MG tablet Take 1 tablet by mouth daily 30 tablet 3     methocarbamol (ROBAXIN) 750 MG tablet Take 1 tablet (750 mg) by mouth 3 times daily 90 tablet 3     Multiple Vitamin (MULTI-VITAMIN) per tablet Take 1 tablet by mouth daily.       omeprazole (PRILOSEC) 20 MG DR capsule Take 1 capsule (20 mg) by mouth daily 30 capsule 3     order for DME Equipment being ordered: 4 wheeled walker w/seat & brakes - please fax to [facility] [phone] 1 Device 0     oxybutynin (DITROPAN) 5 MG tablet Take 2 tablets (10 mg) by mouth 2 times daily 360 tablet 0     sertraline (ZOLOFT) 100 MG tablet Take 1.5 tablets (150 mg) by mouth daily 45 tablet 3     temazepam (RESTORIL) 30 MG capsule Take 1 capsule (30 mg) by mouth nightly as needed for sleep 30 capsule 3     vitamin E (TOCOPHEROL) 100 UNIT capsule Take 100 Units by mouth daily.       Allergies   Allergen Reactions     Amoxicillin      Aspirin      Mild rash     Cephalexin Hives     Flagyl [Metronidazole]      severe hives     Sulfa Drugs Hives     edema       Reviewed and updated as needed this visit by Provider    Patient calls today for concerns of chronic pain that is become worse in her lower back and the right thumb.  She also stubbed her right pinky toe which is now red and swollen and painful starting to turn purple wondering if she needs an x-ray for this.  The lumbar back pain is worse on the left with radiculopathy down the left buttocks into the left thigh she has had a spinal injection in the past which worked well for her this is been over a year ago she works with [facility] in [place] she like to return to the clinic, at the time of assessment she was told she is not a surgical candidate but the epidural spinal injections would work well for her so she would an appointment with  referral back to  her spine care clinic to help with the worsening left-sided radiculopathy and lower lumbar back pain.  She has arthritis of the right hand which is greatly affecting the right thumb, pain is almost constant now, would like to meet with our sports medicine team to determine if there is a chance and enter joint injection would be helpful for her.  She really at this point does not want to come in for an x-ray as she does not think it would change treatment for the right foot, she is icing the foot elevating the foot and taping with some cotton ball between the right pinky toe and the fourth toe.  She states she will let me know if the foot does not slowly improve and if not we will x-rayed at that point.  Patient is also feeling more anxious related to the death of a friend would like to increase her Atarax to 50 mg at bedtime she is running out and needs a new prescription of this.  No other new acute symptoms of concern no fevers or chills no nausea or vomiting no abdominal pain.         Review of Systems   Constitutional, HEENT, cardiovascular, pulmonary, gi and gu systems are negative, except as otherwise noted.       Objective   Reported vitals:  There were no vitals taken for this visit.   alert and no distress  PSYCH: Alert and oriented times 3; coherent speech, normal   rate and volume, able to articulate logical thoughts, able   to abstract reason, no tangential thoughts, no hallucinations   or delusions  Her affect is normal  RESP: No cough, no audible wheezing, able to talk in full sentences  Remainder of exam unable to be completed due to telephone visits    Diagnostic Test Results:  Labs reviewed in Epic        Assessment/Plan:  1. Hyperlipidemia LDL goal <100  -She is due to get her lipids rechecked we will get this scheduled for her.  - Lipid panel reflex to direct LDL Fasting; Future    2. Depression with anxiety    -I will increase the Atarax to 50 mg as needed for panic attacks and increased anxiety  related to the loss of a friend.  Patient is aware to present to ER for severe panic attacks if her medication does not resolve symptoms and she is unable to function.  - hydrOXYzine (ATARAX) 25 MG tablet; Take 2 tablets (50 mg) by mouth every 6 hours as needed for itching  Dispense: 60 tablet; Refill: 3    3. Chronic pain of right thumb  -Would like to get her referred to Dr. Cameron assessment of the arthritis in the right thumb and hand to determine what a good plan of care moving forward would be to include possible joint injections if that is appropriate.  - [facility]  Referral; Future    4. Lumbar radiculopathy  -Patient has worked with the eye spine clinic in [place] for a year a spinal injection that helped with the lumbar radiculopathy about a year ago she would like to be referred back to her clinic in [place] we will get the referral placed for her.  - [facility]  Referral; Future    -Refills of been completed referrals have been made patient is aware she will be called with her appointments.    -Patient verbalized understanding of plan of care and is in agreement with current plan of care.      Phone call duration:  15 minutes    Odilon Fuentes NP

## 2020-07-06 ENCOUNTER — ANCILLARY PROCEDURE (OUTPATIENT)
Dept: GENERAL RADIOLOGY | Facility: CLINIC | Age: 60
End: 2020-07-06
Attending: PHYSICAL MEDICINE & REHABILITATION
Payer: COMMERCIAL

## 2020-07-06 ENCOUNTER — OFFICE VISIT (OUTPATIENT)
Dept: ORTHOPEDICS | Facility: CLINIC | Age: 60
End: 2020-07-06
Payer: COMMERCIAL

## 2020-07-06 VITALS
SYSTOLIC BLOOD PRESSURE: 110 MMHG | HEIGHT: 66 IN | WEIGHT: 170 LBS | BODY MASS INDEX: 27.32 KG/M2 | DIASTOLIC BLOOD PRESSURE: 76 MMHG

## 2020-07-06 DIAGNOSIS — G89.29 CHRONIC PAIN OF RIGHT THUMB: ICD-10-CM

## 2020-07-06 DIAGNOSIS — M79.644 CHRONIC PAIN OF RIGHT THUMB: ICD-10-CM

## 2020-07-06 DIAGNOSIS — G89.29 CHRONIC PAIN OF RIGHT THUMB: Primary | ICD-10-CM

## 2020-07-06 DIAGNOSIS — N39.41 URGENCY INCONTINENCE: ICD-10-CM

## 2020-07-06 DIAGNOSIS — M79.644 CHRONIC PAIN OF RIGHT THUMB: Primary | ICD-10-CM

## 2020-07-06 DIAGNOSIS — M18.11 PRIMARY OSTEOARTHRITIS OF FIRST CARPOMETACARPAL JOINT OF RIGHT HAND: ICD-10-CM

## 2020-07-06 PROCEDURE — 20600 DRAIN/INJ JOINT/BURSA W/O US: CPT | Mod: RT | Performed by: PHYSICAL MEDICINE & REHABILITATION

## 2020-07-06 PROCEDURE — 99244 OFF/OP CNSLTJ NEW/EST MOD 40: CPT | Mod: 25 | Performed by: PHYSICAL MEDICINE & REHABILITATION

## 2020-07-06 PROCEDURE — 73140 X-RAY EXAM OF FINGER(S): CPT | Mod: TC

## 2020-07-06 RX ORDER — OXYBUTYNIN CHLORIDE 5 MG/1
TABLET ORAL
Qty: 120 TABLET | Refills: 0 | Status: SHIPPED | OUTPATIENT
Start: 2020-07-06 | End: 2020-07-13

## 2020-07-06 RX ORDER — TRIAMCINOLONE ACETONIDE 40 MG/ML
40 INJECTION, SUSPENSION INTRA-ARTICULAR; INTRAMUSCULAR
Status: SHIPPED | OUTPATIENT
Start: 2020-07-06

## 2020-07-06 RX ADMIN — TRIAMCINOLONE ACETONIDE 40 MG: 40 INJECTION, SUSPENSION INTRA-ARTICULAR; INTRAMUSCULAR at 13:38

## 2020-07-06 ASSESSMENT — MIFFLIN-ST. JEOR: SCORE: 1353.89

## 2020-07-06 NOTE — PROGRESS NOTES
"Sports Medicine Clinic Visit    PCP: Odilon Fuentes    CC: Patient presents with:  Right Hand - Pain      HPI:  Nicole Franco is a 59 year old female who is seen in consultation at the request of Odilon Fuentes NP.   She notes right thumb pain that began 1-2 years ago. She notes it started with pain in the thumb.  She notes numbness that started in the right hand and is moving up the arm. He does have neck pain and says that \"it's arthritis.\"  She rates the pain at a 10/10 at its worst and a 2/10 currently.  Symptoms are relieved with bracing. Symptoms are worsened by resting her forearm. She endorses clicking, numbness, tingling and weakness.   She denies swelling and bruising.  Other treatment has included cold compresses, Tylenol and ibuprofen. She notes difficulty with writing and ADLs. She in interested in a steroid injection.      Review of Systems:  Musculoskeletal: as above  Remainder of review of systems is negative including constitutional, eyes, ENT, CV, pulmonary, GI, , endocrine, skin, hematologic, and neurologic except as noted in HPI or medical history.    History reviewed. No pertinent past surgical/medical/family/social history other than as mentioned in HPI.    Patient Active Problem List   Diagnosis     Abnormal Pap smear of cervix     Anxiety state     Depression with anxiety     Chronic pain disorder     Fibromyalgia     Carpal tunnel syndrome of right wrist     Decreased libido     Degeneration of intervertebral disc     DUB (dysfunctional uterine bleeding)     Dysplastic nevus     Dysthymic disorder     Essential hypertension     Excessive or frequent menstruation     Gastroesophageal reflux disease     Hemorrhoids     Varicose vein of leg     Insomnia     Malignant neoplasm of breast (H)     Personal history of malignant neoplasm of breast     Primary osteoarthritis of first carpometacarpal joint of right hand     Raynaud's disease without gangrene     Menopause present     " Tobacco use disorder     Past Medical History:   Diagnosis Date     Anxiety      Arthritis      Breast CA (H)      Depressive disorder      Hypertension      Skin cancer      Past Surgical History:   Procedure Laterality Date     APPENDECTOMY       COLONOSCOPY N/A 2/18/2020    Procedure: Colonoscopy;  Surgeon: Dominic Louis DO;  Location: [facility]     MASTECTOMY RADICAL       No family history on file.  Social History     Socioeconomic History     Marital status:      Spouse name: Not on file     Number of children: 2     Years of education: Not on file     Highest education level: Not on file   Occupational History     Not on file   Social Needs     Financial resource strain: Very hard     Food insecurity     Worry: Sometimes true     Inability: Sometimes true     Transportation needs     Medical: Not on file     Non-medical: Not on file   Tobacco Use     Smoking status: Current Every Day Smoker     Packs/day: 0.50     Smokeless tobacco: Never Used   Substance and Sexual Activity     Alcohol use: Yes     Alcohol/week: 1.7 standard drinks     Types: 2 Shots of liquor per week     Comment: occ     Drug use: No     Sexual activity: Yes     Partners: Male   Lifestyle     Physical activity     Days per week: Not on file     Minutes per session: Not on file     Stress: Not on file   Relationships     Social connections     Talks on phone: Not on file     Gets together: Not on file     Attends Mu-ism service: Not on file     Active member of club or organization: Not on file     Attends meetings of clubs or organizations: Not on file     Relationship status: Not on file     Intimate partner violence     Fear of current or ex partner: Not on file     Emotionally abused: Not on file     Physically abused: Not on file     Forced sexual activity: Not on file   Other Topics Concern     Parent/sibling w/ CABG, MI or angioplasty before 65F 55M? Not Asked   Social History Narrative     Not on file       She is  "on disability.  She also works as a PCA.    Current Outpatient Medications   Medication     ascorbic acid (VITAMIN C) 500 MG tablet     B Complex Vitamins (VITAMIN B COMPLEX PO)     busPIRone (BUSPAR) 10 MG tablet     Calcium Carbonate 650 MG TABS     cholecalciferol (VITAMIN D) 1000 UNIT tablet     fish oil-omega-3 fatty acids (FISH OIL) 1000 MG capsule     Flaxseed, Linseed, 1200 MG CAPS     gabapentin (NEURONTIN) 300 MG capsule     gemfibrozil (LOPID) 600 MG tablet     hydrOXYzine (ATARAX) 25 MG tablet     ibuprofen (ADVIL/MOTRIN) 600 MG tablet     lisinopril-hydrochlorothiazide (ZESTORETIC) 20-25 MG tablet     methocarbamol (ROBAXIN) 750 MG tablet     Multiple Vitamin (MULTI-VITAMIN) per tablet     omeprazole (PRILOSEC) 20 MG DR capsule     order for DME     oxybutynin (DITROPAN) 5 MG tablet     sertraline (ZOLOFT) 100 MG tablet     temazepam (RESTORIL) 30 MG capsule     vitamin E (TOCOPHEROL) 100 UNIT capsule     No current facility-administered medications for this visit.      Allergies   Allergen Reactions     Amoxicillin      Aspirin      Mild rash     Cephalexin Hives     Flagyl [Metronidazole]      severe hives     Sulfa Drugs Hives     edema         Objective:  /76   Ht 1.67 m (5' 5.75\")   Wt 77.1 kg (170 lb)   BMI 27.65 kg/m      General: Alert and in no distress    Head: Normocephalic, atraumatic  Eyes: no scleral icterus or conjunctival erythema   Skin: no erythema, petechiae, or jaundice  CV: regular rhythm by palpation, 2+ distal pulses  Resp: normal respiratory effort without conversational dyspnea   Psych: normal mood and affect    Neuro: Motor strength and sensation as noted below    Musculoskeletal:    Bilateral Wrist and Hand exam    Inspection:       -Prominent right 1st CMC joint compared to the left    Palpation:  -Tenderness over the right CMC joint    ROM:       Full and symmetric active range of motion of the forearm, wrist and digits bilaterally    Strength:  Forearm supination " 5/5 bilaterally  Forearm pronation 5/5 bilaterally  Wrist extension 5/5 bilaterally  Wrist flexion 5-/5 right, 5/5 left   strength 4+/5 right, 5/5 left  Finger abduction 4/5 right, 5/5 left  Thumb flexion at the MCP and IP joints 4/5 right, 5/5 left    Special Tests:         pos (+) basal grind test on the right        neg (-) Finkelstein's maneuver bilaterally    Neurovascular:       2+ radial pulses bilaterally        Altered sensation to light touch over the right upper arm, forearm, hand, and digits      Radiology:  X-rays ordered and independent visualization of images performed and reviewed with [facility].    Recent Results (from the past 744 hour(s))   XR Finger Right G/E 2 Views    Narrative    XR FINGER RT G/E 2 VW 7/6/2020 1:16 PM     HISTORY: Chronic pain of right thumb; Chronic pain of right thumb      Impression    IMPRESSION: First carpometacarpal joint degenerative arthrosis. No  evidence of fracture or dislocation.    PEBBLES RIVERO MD       Hand / Upper Extremity Injection/Arthrocentesis: R thumb CMC    Date/Time: 7/6/2020 1:38 PM  Performed by: Fabienne Cameron MD  Authorized by: Fabienne Cameron MD     Indications:  Pain  Needle Size:  25 G  Guidance: landmark    Approach:  Radial  Condition: osteoarthritis    Location:  Thumb  Site:  R thumb CMC    Medications:  40 mg triamcinolone 40 MG/ML  Medications comment:  0.5ml 0.5% bupivicaine  NDC:39277-156-13  Lot: [id number]  1/31/21      Outcome:  Tolerated well, no immediate complications  Procedure discussed: discussed risks, benefits, and alternatives    Consent Given by:  Patient          Assessment:  1. Chronic pain of right thumb    2. Primary osteoarthritis of first carpometacarpal joint of right hand        Plan:  Discussed the assessment with the patient and developed a plan together:  -Steroid injection performed today.  Take it easy over the next few days. Keep in mind that the steroid may take up to 3 days to start  working and up to 2 weeks to reach maximal effect.  Ice 15-20 minutes as needed for soreness.  Patient's preferred over the counter medication as needed for pain as directed on packaging.  -Brace as needed for comfort and support.    -Occupational therapy referral.  Please do 5-6 days of exercises per week between formal sessions and the home exercises they provide.  -Avoid aggravating activities.    -Follow up as needed if symptoms fail to improve or worsen.  Please call with questions or concerns.        Jenna Cameron MD, CAQ Sports Medicine  [facility]

## 2020-07-06 NOTE — TELEPHONE ENCOUNTER
"  Requested Prescriptions   Pending Prescriptions Disp Refills     oxybutynin (DITROPAN) 5 MG tablet [Pharmacy Med Name: Oxybutynin Chloride 5 MG Oral Tablet] 360 tablet 0     Sig: Take 2 tablets by mouth twice daily   Last Written Prescription Date:  4/13/2020  Last Fill Quantity: 360,  # refills: 0   Last office visit: 6/26/2020 with prescribing provider:     Future Office Visit:        Muscarinic Antagonists (Urinary Incontinence Agents) Passed - 7/6/2020  5:30 AM        Passed - Recent (12 mo) or future (30 days) visit within the authorizing provider's specialty     Patient has had an office visit with the authorizing provider or a provider within the authorizing providers department within the previous 12 mos or has a future within next 30 days. See \"Patient Info\" tab in inbasket, or \"Choose Columns\" in Meds & Orders section of the refill encounter.              Passed - Medication is Oxybutynin and patient is 5 years of age or older        Passed - Patient does not have a diagnosis of glaucoma on the problem list     If glaucoma diagnosis is new, refer refill to physician.          Passed - Medication is active on med list        Passed - Patient is 18 years of age or older         Medication is being filled for 1 time refill only due to:  End date 7/12/2020   Kacey Odonnell RN      "

## 2020-07-06 NOTE — LETTER
"    7/6/2020         RE: Nicole Franco  [id number] [id number]  [place] [address]        Dear Colleague,    Thank you for referring your patient, Nicole Franco, to the [facility]. Please see a copy of my visit note below.    Sports Medicine Clinic Visit    PCP: Odilon Fuentes    CC: Patient presents with:  Right Hand - Pain      HPI:  Nicole Franco is a 59 year old female who is seen in consultation at the request of Odilon Fuentes NP.   She notes right thumb pain that began 1-2 years ago. She notes it started with pain in the thumb.  She notes numbness that started in the right hand and is moving up the arm. He does have neck pain and says that \"it's arthritis.\"  She rates the pain at a 10/10 at its worst and a 2/10 currently.  Symptoms are relieved with bracing. Symptoms are worsened by resting her forearm. She endorses clicking, numbness, tingling and weakness.   She denies swelling and bruising.  Other treatment has included cold compresses, Tylenol and ibuprofen. She notes difficulty with writing and ADLs. She in interested in a steroid injection.      Review of Systems:  Musculoskeletal: as above  Remainder of review of systems is negative including constitutional, eyes, ENT, CV, pulmonary, GI, , endocrine, skin, hematologic, and neurologic except as noted in HPI or medical history.    History reviewed. No pertinent past surgical/medical/family/social history other than as mentioned in HPI.    Patient Active Problem List   Diagnosis     Abnormal Pap smear of cervix     Anxiety state     Depression with anxiety     Chronic pain disorder     Fibromyalgia     Carpal tunnel syndrome of right wrist     Decreased libido     Degeneration of intervertebral disc     DUB (dysfunctional uterine bleeding)     Dysplastic nevus     Dysthymic disorder     Essential hypertension     Excessive or frequent menstruation     Gastroesophageal reflux disease     Hemorrhoids     Varicose vein of " leg     Insomnia     Malignant neoplasm of breast (H)     Personal history of malignant neoplasm of breast     Primary osteoarthritis of first carpometacarpal joint of right hand     Raynaud's disease without gangrene     Menopause present     Tobacco use disorder     Past Medical History:   Diagnosis Date     Anxiety      Arthritis      Breast CA (H)      Depressive disorder      Hypertension      Skin cancer      Past Surgical History:   Procedure Laterality Date     APPENDECTOMY       COLONOSCOPY N/A 2/18/2020    Procedure: Colonoscopy;  Surgeon: Dominic Louis DO;  Location: [facility]     MASTECTOMY RADICAL       No family history on file.  Social History     Socioeconomic History     Marital status:      Spouse name: Not on file     Number of children: 2     Years of education: Not on file     Highest education level: Not on file   Occupational History     Not on file   Social Needs     Financial resource strain: Very hard     Food insecurity     Worry: Sometimes true     Inability: Sometimes true     Transportation needs     Medical: Not on file     Non-medical: Not on file   Tobacco Use     Smoking status: Current Every Day Smoker     Packs/day: 0.50     Smokeless tobacco: Never Used   Substance and Sexual Activity     Alcohol use: Yes     Alcohol/week: 1.7 standard drinks     Types: 2 Shots of liquor per week     Comment: occ     Drug use: No     Sexual activity: Yes     Partners: Male   Lifestyle     Physical activity     Days per week: Not on file     Minutes per session: Not on file     Stress: Not on file   Relationships     Social connections     Talks on phone: Not on file     Gets together: Not on file     Attends Quaker service: Not on file     Active member of club or organization: Not on file     Attends meetings of clubs or organizations: Not on file     Relationship status: Not on file     Intimate partner violence     Fear of current or ex partner: Not on file     Emotionally  "abused: Not on file     Physically abused: Not on file     Forced sexual activity: Not on file   Other Topics Concern     Parent/sibling w/ CABG, MI or angioplasty before 65F 55M? Not Asked   Social History Narrative     Not on file       She is on disability.  She also works as a PCA.    Current Outpatient Medications   Medication     ascorbic acid (VITAMIN C) 500 MG tablet     B Complex Vitamins (VITAMIN B COMPLEX PO)     busPIRone (BUSPAR) 10 MG tablet     Calcium Carbonate 650 MG TABS     cholecalciferol (VITAMIN D) 1000 UNIT tablet     fish oil-omega-3 fatty acids (FISH OIL) 1000 MG capsule     Flaxseed, Linseed, 1200 MG CAPS     gabapentin (NEURONTIN) 300 MG capsule     gemfibrozil (LOPID) 600 MG tablet     hydrOXYzine (ATARAX) 25 MG tablet     ibuprofen (ADVIL/MOTRIN) 600 MG tablet     lisinopril-hydrochlorothiazide (ZESTORETIC) 20-25 MG tablet     methocarbamol (ROBAXIN) 750 MG tablet     Multiple Vitamin (MULTI-VITAMIN) per tablet     omeprazole (PRILOSEC) 20 MG DR capsule     order for DME     oxybutynin (DITROPAN) 5 MG tablet     sertraline (ZOLOFT) 100 MG tablet     temazepam (RESTORIL) 30 MG capsule     vitamin E (TOCOPHEROL) 100 UNIT capsule     No current facility-administered medications for this visit.      Allergies   Allergen Reactions     Amoxicillin      Aspirin      Mild rash     Cephalexin Hives     Flagyl [Metronidazole]      severe hives     Sulfa Drugs Hives     edema         Objective:  /76   Ht 1.67 m (5' 5.75\")   Wt 77.1 kg (170 lb)   BMI 27.65 kg/m      General: Alert and in no distress    Head: Normocephalic, atraumatic  Eyes: no scleral icterus or conjunctival erythema   Skin: no erythema, petechiae, or jaundice  CV: regular rhythm by palpation, 2+ distal pulses  Resp: normal respiratory effort without conversational dyspnea   Psych: normal mood and affect    Neuro: Motor strength and sensation as noted below    Musculoskeletal:    Bilateral Wrist and Hand " exam    Inspection:       -Prominent right 1st CMC joint compared to the left    Palpation:  -Tenderness over the right CMC joint    ROM:       Full and symmetric active range of motion of the forearm, wrist and digits bilaterally    Strength:  Forearm supination 5/5 bilaterally  Forearm pronation 5/5 bilaterally  Wrist extension 5/5 bilaterally  Wrist flexion 5-/5 right, 5/5 left   strength 4+/5 right, 5/5 left  Finger abduction 4/5 right, 5/5 left  Thumb flexion at the MCP and IP joints 4/5 right, 5/5 left    Special Tests:         pos (+) basal grind test on the right        neg (-) Finkelstein's maneuver bilaterally    Neurovascular:       2+ radial pulses bilaterally        Altered sensation to light touch over the right upper arm, forearm, hand, and digits      Radiology:  X-rays ordered and independent visualization of images performed and reviewed with [facility].    Recent Results (from the past 744 hour(s))   XR Finger Right G/E 2 Views    Narrative    XR FINGER RT G/E 2 VW 7/6/2020 1:16 PM     HISTORY: Chronic pain of right thumb; Chronic pain of right thumb      Impression    IMPRESSION: First carpometacarpal joint degenerative arthrosis. No  evidence of fracture or dislocation.    PEBBLES RIVERO MD       Hand / Upper Extremity Injection/Arthrocentesis: R thumb CMC    Date/Time: 7/6/2020 1:38 PM  Performed by: Fabienne Cameron MD  Authorized by: Fabienne Cameron MD     Indications:  Pain  Needle Size:  25 G  Guidance: landmark    Approach:  Radial  Condition: osteoarthritis    Location:  Thumb  Site:  R thumb CMC    Medications:  40 mg triamcinolone 40 MG/ML  Medications comment:  0.5ml 0.5% bupivicaine  NDC:19085-850-97  Lot: [id number]  1/31/21      Outcome:  Tolerated well, no immediate complications  Procedure discussed: discussed risks, benefits, and alternatives    Consent Given by:  Patient          Assessment:  1. Chronic pain of right thumb    2. Primary osteoarthritis of first  carpometacarpal joint of right hand        Plan:  Discussed the assessment with the patient and developed a plan together:  -Steroid injection performed today.  Take it easy over the next few days. Keep in mind that the steroid may take up to 3 days to start working and up to 2 weeks to reach maximal effect.  Ice 15-20 minutes as needed for soreness.  Patient's preferred over the counter medication as needed for pain as directed on packaging.  -Brace as needed for comfort and support.    -Occupational therapy referral.  Please do 5-6 days of exercises per week between formal sessions and the home exercises they provide.  -Avoid aggravating activities.    -Follow up as needed if symptoms fail to improve or worsen.  Please call with questions or concerns.        Jenna Cameron MD, [facility]  [facility]    Again, thank you for allowing me to participate in the care of your patient.        Sincerely,        Fabienne Cameron MD

## 2020-07-06 NOTE — PATIENT INSTRUCTIONS
-Steroid injection performed today.  Take it easy over the next few days. Keep in mind that the steroid may take up to 3 days to start working and up to 2 weeks to reach maximal effect.  Ice 15-20 minutes as needed for soreness.  Patient's preferred over the counter medication as needed for pain as directed on packaging.  -Brace as needed for comfort and support.    -Occupational therapy referral.  Please do 5-6 days of exercises per week between formal sessions and the home exercises they provide.    -Follow up as needed if symptoms fail to improve or worsen.  Please call with questions or concerns.

## 2020-07-15 ENCOUNTER — TRANSFERRED RECORDS (OUTPATIENT)
Dept: HEALTH INFORMATION MANAGEMENT | Facility: CLINIC | Age: 60
End: 2020-07-15

## 2020-07-24 DIAGNOSIS — G89.29 OTHER CHRONIC PAIN: ICD-10-CM

## 2020-07-29 RX ORDER — GABAPENTIN 300 MG/1
CAPSULE ORAL
Qty: 720 CAPSULE | Refills: 0 | Status: SHIPPED | OUTPATIENT
Start: 2020-07-29 | End: 2020-12-31

## 2020-07-31 DIAGNOSIS — F51.01 PRIMARY INSOMNIA: ICD-10-CM

## 2020-07-31 RX ORDER — TEMAZEPAM 30 MG
CAPSULE ORAL
Qty: 30 CAPSULE | Refills: 0 | Status: SHIPPED | OUTPATIENT
Start: 2020-07-31 | End: 2020-08-27

## 2020-07-31 NOTE — TELEPHONE ENCOUNTER
Temazepam      Last Written Prescription Date:  4/13/2020  Last Fill Quantity: 30,   # refills: 3  Last Office Visit: 6/26/2020  Future Office visit:       Routing refill request to provider for review/approval because:  Drug not on the [facility], [facility] or [facility] refill protocol or controlled substance

## 2020-08-10 ENCOUNTER — TRANSFERRED RECORDS (OUTPATIENT)
Dept: HEALTH INFORMATION MANAGEMENT | Facility: CLINIC | Age: 60
End: 2020-08-10

## 2020-08-14 ENCOUNTER — HOSPITAL ENCOUNTER (OUTPATIENT)
Dept: MRI IMAGING | Facility: CLINIC | Age: 60
Discharge: HOME | End: 2020-08-14
Attending: ANESTHESIOLOGY | Admitting: ANESTHESIOLOGY
Payer: COMMERCIAL

## 2020-08-14 DIAGNOSIS — M47.812 CERVICAL SPONDYLOSIS WITHOUT MYELOPATHY: ICD-10-CM

## 2020-08-14 PROCEDURE — 72141 MRI NECK SPINE W/O DYE: CPT

## 2020-08-27 DIAGNOSIS — F51.01 PRIMARY INSOMNIA: ICD-10-CM

## 2020-08-27 RX ORDER — TEMAZEPAM 30 MG
CAPSULE ORAL
Qty: 30 CAPSULE | Refills: 0 | Status: SHIPPED | OUTPATIENT
Start: 2020-08-27 | End: 2020-09-28

## 2020-08-27 NOTE — TELEPHONE ENCOUNTER
Temazepam 30 MG       Last Written Prescription Date:  7/31/2020  Last Fill Quantity: 30,   # refills: 0  Last Office Visit: 6-  Future Office visit:       Routing refill request to provider for review/approval because:  Drug not on the [facility], [facility] or [facility] refill protocol or controlled substance

## 2020-09-15 ENCOUNTER — VIRTUAL VISIT (OUTPATIENT)
Dept: FAMILY MEDICINE | Facility: CLINIC | Age: 60
End: 2020-09-15
Payer: COMMERCIAL

## 2020-09-15 DIAGNOSIS — M50.30 DDD (DEGENERATIVE DISC DISEASE), CERVICAL: Primary | ICD-10-CM

## 2020-09-15 PROCEDURE — 99213 OFFICE O/P EST LOW 20 MIN: CPT | Mod: 95 | Performed by: NURSE PRACTITIONER

## 2020-09-15 ASSESSMENT — PATIENT HEALTH QUESTIONNAIRE - PHQ9: SUM OF ALL RESPONSES TO PHQ QUESTIONS 1-9: 10

## 2020-09-15 NOTE — PATIENT INSTRUCTIONS
MRI on 8/14/20: Ordered by Dr. Caraballo showing degenerative changes in the cervical spine.    Discussed need for patient to follow up with Dr. Caraballo from [facility] to discuss imaging and plan of care moving forward.     Gave patient contact information for [facility] in [place]: [phone].     Patient instructed to call us back if she is not able to get appointment in the next week.     Odilon Fuentes CNP

## 2020-09-15 NOTE — PROGRESS NOTES
"Nicole Franco is a 59 year old female who is being evaluated via a billable telephone visit.      The patient has been notified of following:     \"This telephone visit will be conducted via a call between you and your physician/provider. We have found that certain health care needs can be provided without the need for a physical exam.  This service lets us provide the care you need with a short phone conversation.  If a prescription is necessary we can send it directly to your pharmacy.  If lab work is needed we can place an order for that and you can then stop by our lab to have the test done at a later time.    Telephone visits are billed at different rates depending on your insurance coverage. During this emergency period, for some insurers they may be billed the same as an in-person visit.  Please reach out to your insurance provider with any questions.    If during the course of the call the physician/provider feels a telephone visit is not appropriate, you will not be charged for this service.\"    Patient has given verbal consent for Telephone visit?  Yes    What phone number would you like to be contacted at? [phone]    How would you like to obtain your AVS? Mail a copy    Subjective     Nicole Franco is a 59 year old female who presents via phone visit today for the following health issues:    HPI    Go over MRI results. Stating Block in back didn't work and wondering about next steps.     Patient has a long history for chronic pain disorder, DDD, osteoarthritis of the right hand, and fibromyalgia.     She is being followed by [facility] in [place] and had a MRI in August. She is having more neck and back pain radiating down her arms. Wants to get results to the MRI and discuss plan regarding the on going back pain. She has not heard from Dr. Caraballo since the MRI was ordered.                Review of Systems   Constitutional, HEENT, cardiovascular, pulmonary, gi and gu systems are " negative, except as otherwise noted.       Objective          Vitals:  No vitals were obtained today due to virtual visit.    alert and mild distress  PSYCH: Alert and oriented times 3; coherent speech, normal   rate and volume, able to articulate logical thoughts, able   to abstract reason, no tangential thoughts, no hallucinations   or delusions  Her affect is anxious  RESP: No cough, no audible wheezing, able to talk in full sentences  Remainder of exam unable to be completed due to telephone visits    MRI from 8/14 reviewed. Note From [facility] Dr. Caraballo 8/14 reviewed.         Assessment/Plan:    Assessment & Plan     DDD (degenerative disc disease), cervical  - Reviewed imaging. Let her know she does have degenerative changes that I would like Dr. Caraballo to address.   - Provided her a number to call Dr. Caraballo office, instructed to get an appointment or call from Dr. Caraballo or his team.   - She is to call me if she is unable to get scheduled in the next day or two.     - Patient verbalized understanding of plan of care.     -Patient instructed to call clinic with new or worsening symptoms prior to her next follow up appointment.       Odilon Fuentes NP  [facility]    Phone call duration:  11 minutes

## 2020-09-17 ENCOUNTER — TRANSFERRED RECORDS (OUTPATIENT)
Dept: HEALTH INFORMATION MANAGEMENT | Facility: CLINIC | Age: 60
End: 2020-09-17

## 2020-09-25 DIAGNOSIS — F41.8 DEPRESSION WITH ANXIETY: ICD-10-CM

## 2020-09-25 RX ORDER — SERTRALINE HYDROCHLORIDE 100 MG/1
TABLET, FILM COATED ORAL
Qty: 45 TABLET | Refills: 0 | Status: SHIPPED | OUTPATIENT
Start: 2020-09-25 | End: 2020-10-21

## 2020-09-25 NOTE — TELEPHONE ENCOUNTER
"Routing refill request to provider for review/approval because:  PHQ9 score out of range  T'd up 1 month for provider review.    Requested Prescriptions   Pending Prescriptions Disp Refills     sertraline (ZOLOFT) 100 MG tablet [Pharmacy Med Name: Sertraline HCl 100 MG Oral Tablet] 45 tablet 0     Sig: TAKE 1 & 1/2 (ONE & ONE-HALF) TABLETS BY MOUTH ONCE DAILY   Last Written Prescription Date:  4/13/2020  Last Fill Quantity: 45,  # refills: 3   Last office visit: 9/15/2020with prescribing provider:     Future Office Visit:        SSRIs Protocol Failed - 9/25/2020 12:31 PM        Failed - PHQ-9 score less than 5 in past 6 months     Please review last PHQ-9 score.   PHQ-9 score:    PHQ 9/15/2020   PHQ-9 Total Score 10   Q9: Thoughts of better off dead/self-harm past 2 weeks Not at all           Passed - Medication is active on med list        Passed - Patient is age 18 or older        Passed - No active pregnancy on record        Passed - No positive pregnancy test in last 12 months        Passed - Recent (6 mo) or future (30 days) visit within the authorizing provider's specialty     Patient had office visit in the last 6 months or has a visit in the next 30 days with authorizing provider or within the authorizing provider's specialty.  See \"Patient Info\" tab in inbasket, or \"Choose Columns\" in Meds & Orders section of the refill encounter.             Kacey Odonnell RN      "

## 2020-09-27 DIAGNOSIS — F51.01 PRIMARY INSOMNIA: ICD-10-CM

## 2020-09-28 ENCOUNTER — TELEPHONE (OUTPATIENT)
Dept: FAMILY MEDICINE | Facility: CLINIC | Age: 60
End: 2020-09-28

## 2020-09-28 RX ORDER — TEMAZEPAM 30 MG
CAPSULE ORAL
Qty: 30 CAPSULE | Refills: 0 | Status: SHIPPED | OUTPATIENT
Start: 2020-09-28 | End: 2020-10-21

## 2020-09-28 NOTE — TELEPHONE ENCOUNTER
Reason for call:  Other   Patient called regarding (reason for call): appointment  Additional comments: Patient wanting to establish care and talk about test results from MRI at [facility] with specialist before 10/27 per pt.     Phone number to reach patient:  Home number on file [phone] (home)    Best Time:  Late Morning     Can we leave a detailed message on this number?  YES    Travel screening: Not Applicable

## 2020-09-28 NOTE — TELEPHONE ENCOUNTER
Requested Prescriptions   Pending Prescriptions Disp Refills     temazepam (RESTORIL) 30 MG capsule [Pharmacy Med Name: Temazepam 30 MG Oral Capsule] 30 capsule 0     Sig: TAKE 1 CAPSULE BY MOUTH NIGHTLY AS NEEDED FOR SLEEP     Last Written Prescription Date:  08/27/2020  Last Fill Quantity: 30,   # refills: 0  Last Office Visit: 09/15/2020  Future Office visit:    Next 5 appointments (look out 90 days)    Oct 02, 2020 12:40 PM CDT  Office Visit with Anjel Garland MD  [facility] ([address] [id number] [phone]           Routing refill request to provider for review/approval because:  Drug not on the [facility], [facility] or  Health refill protocol or controlled substance    Keya Gutierrez MA

## 2020-10-02 ENCOUNTER — OFFICE VISIT (OUTPATIENT)
Dept: FAMILY MEDICINE | Facility: CLINIC | Age: 60
End: 2020-10-02
Payer: COMMERCIAL

## 2020-10-02 VITALS
BODY MASS INDEX: 27.11 KG/M2 | TEMPERATURE: 96 F | WEIGHT: 166.7 LBS | HEART RATE: 75 BPM | DIASTOLIC BLOOD PRESSURE: 80 MMHG | RESPIRATION RATE: 16 BRPM | SYSTOLIC BLOOD PRESSURE: 118 MMHG | OXYGEN SATURATION: 96 %

## 2020-10-02 DIAGNOSIS — R39.9 UTI SYMPTOMS: Primary | ICD-10-CM

## 2020-10-02 LAB
ALBUMIN UR-MCNC: NEGATIVE MG/DL
APPEARANCE UR: CLEAR
BILIRUB UR QL STRIP: NEGATIVE
COLOR UR AUTO: YELLOW
GLUCOSE UR STRIP-MCNC: NEGATIVE MG/DL
HGB UR QL STRIP: NEGATIVE
KETONES UR STRIP-MCNC: NEGATIVE MG/DL
LEUKOCYTE ESTERASE UR QL STRIP: NEGATIVE
NITRATE UR QL: NEGATIVE
PH UR STRIP: 7 PH (ref 5–7)
SOURCE: NORMAL
SP GR UR STRIP: 1.01 (ref 1–1.03)
UROBILINOGEN UR STRIP-MCNC: 0 MG/DL (ref 0–2)

## 2020-10-02 PROCEDURE — 99213 OFFICE O/P EST LOW 20 MIN: CPT | Performed by: FAMILY MEDICINE

## 2020-10-02 PROCEDURE — 81003 URINALYSIS AUTO W/O SCOPE: CPT | Performed by: FAMILY MEDICINE

## 2020-10-02 RX ORDER — PHENAZOPYRIDINE HYDROCHLORIDE 200 MG/1
200 TABLET, FILM COATED ORAL 3 TIMES DAILY PRN
Qty: 6 TABLET | Refills: 0 | Status: SHIPPED | OUTPATIENT
Start: 2020-10-02 | End: 2021-01-26

## 2020-10-02 RX ORDER — GLUCOSAMINE HCL 500 MG
TABLET ORAL
COMMUNITY

## 2020-10-02 NOTE — PROGRESS NOTES
Subjective     Nicole Franco is a 59 year old female who presents to clinic today for the following health issues:    HPI         Genitourinary - Female  Onset/Duration: 1 1/2 weeks   Description:   Painful urination (Dysuria): no-pressure           Frequency: YES  Blood in urine (Hematuria): no  Delay in urine (Hesitency): YES  Intensity: mild, moderate  Progression of Symptoms:  worsening  Accompanying Signs & Symptoms:  Fever/chills: no  Flank pain: no  Nausea and vomiting: no  Vaginal symptoms: none  Abdominal/Pelvic Pain: YES  History:   History of frequent UTI s: YES  History of kidney stones: no  Sexually Active: no  Possibility of pregnancy: No  Precipitating or alleviating factors: None  Therapies tried and outcome: Cranberry juice prn (contraindicated in Coumadin patients) and Increase fluid intake    SUBJECTIVE:  Nicole  is a 59 year old female who presents for: Symptoms as noted above.  She is dated she has had UTIs in the past.  Her main symptoms are frequency and hesitancy.  He denies any discharge.    Past Medical History:   Diagnosis Date     Anxiety      Arthritis      Breast CA (H)      Depressive disorder      Hypertension      Skin cancer      Past Surgical History:   Procedure Laterality Date     APPENDECTOMY       COLONOSCOPY N/A 2/18/2020    Procedure: Colonoscopy;  Surgeon: Dominic Louis DO;  Location:  GI     MASTECTOMY RADICAL       Social History     Tobacco Use     Smoking status: Current Every Day Smoker     Packs/day: 0.50     Smokeless tobacco: Never Used   Substance Use Topics     Alcohol use: Yes     Alcohol/week: 1.7 standard drinks     Types: 2 Shots of liquor per week     Comment: occ     Current Outpatient Medications   Medication Sig Dispense Refill     ascorbic acid (VITAMIN C) 500 MG tablet Take 500 mg by mouth daily.       B Complex Vitamins (VITAMIN B COMPLEX PO) B 12       busPIRone (BUSPAR) 10 MG tablet Take 1 tablet (10 mg) by mouth 3 times daily 90  tablet 3     Calcium Carbonate 650 MG TABS Take  by mouth.       cholecalciferol (VITAMIN D) 1000 UNIT tablet Take 1 tablet by mouth daily.       Cranberry 400 MG TABS        fish oil-omega-3 fatty acids (FISH OIL) 1000 MG capsule Take 1 g by mouth daily.       Flaxseed, Linseed, 1200 MG CAPS Take  by mouth.       gabapentin (NEURONTIN) 300 MG capsule TAKE 4 CAPSULES BY MOUTH TWICE DAILY 720 capsule 0     gemfibrozil (LOPID) 600 MG tablet Take 1 tablet (600 mg) by mouth 2 times daily (before meals) 60 tablet 3     hydrOXYzine (ATARAX) 25 MG tablet Take 2 tablets (50 mg) by mouth every 6 hours as needed for itching 60 tablet 3     ibuprofen (ADVIL/MOTRIN) 600 MG tablet Take 1 tablet (600 mg) by mouth 2 times daily 60 tablet 3     lisinopril-hydrochlorothiazide (ZESTORETIC) 20-25 MG tablet Take 1 tablet by mouth daily 30 tablet 3     methocarbamol (ROBAXIN) 750 MG tablet Take 1 tablet (750 mg) by mouth 3 times daily 90 tablet 3     Multiple Vitamin (MULTI-VITAMIN) per tablet Take 1 tablet by mouth daily.       omeprazole (PRILOSEC) 20 MG DR capsule Take 1 capsule (20 mg) by mouth daily 30 capsule 3     order for DME Equipment being ordered: 4 wheeled walker w/seat & brakes - please fax to [facility] [phone] 1 Device 0     oxybutynin (DITROPAN) 5 MG tablet Take 2 tablets by mouth twice daily 60 tablet 3     phenazopyridine (PYRIDIUM) 200 MG tablet Take 1 tablet (200 mg) by mouth 3 times daily as needed for irritation 6 tablet 0     sertraline (ZOLOFT) 100 MG tablet TAKE 1 & 1/2 (ONE & ONE-HALF) TABLETS BY MOUTH ONCE DAILY 45 tablet 0     temazepam (RESTORIL) 30 MG capsule TAKE 1 CAPSULE BY MOUTH NIGHTLY AS NEEDED FOR SLEEP 30 capsule 0     vitamin E (TOCOPHEROL) 100 UNIT capsule Take 100 Units by mouth daily.         REVIEW OF SYSTEMS:   5 point ROS negative except as noted above in HPI, including Gen., Resp, CV, GI &  system review.     OBJECTIVE:  Vitals: /80   Pulse 75   Temp 96   F (35.6  C) (Temporal)   Resp 16   Wt 75.6 kg (166 lb 11.2 oz)   SpO2 96%   BMI 27.11 kg/m    BMI= Body mass index is 27.11 kg/m .  She is alert appears in no distress.  Her urinalysis is entirely normal.  No significant suprapubic discomfort.    ASSESSMENT:  Dysuria    PLAN:  We are going to treat her with some Pyridium to see if this helps drink plenty of water follow-up if not improving will need further evaluation.        Anjel Garland MD  [facility]

## 2020-10-05 ENCOUNTER — TRANSFERRED RECORDS (OUTPATIENT)
Dept: HEALTH INFORMATION MANAGEMENT | Facility: CLINIC | Age: 60
End: 2020-10-05

## 2020-10-14 ENCOUNTER — TRANSFERRED RECORDS (OUTPATIENT)
Dept: HEALTH INFORMATION MANAGEMENT | Facility: CLINIC | Age: 60
End: 2020-10-14

## 2020-10-21 DIAGNOSIS — F51.01 PRIMARY INSOMNIA: ICD-10-CM

## 2020-10-21 DIAGNOSIS — F41.8 DEPRESSION WITH ANXIETY: ICD-10-CM

## 2020-10-21 DIAGNOSIS — I10 ESSENTIAL HYPERTENSION: ICD-10-CM

## 2020-10-21 DIAGNOSIS — K21.9 GASTROESOPHAGEAL REFLUX DISEASE WITHOUT ESOPHAGITIS: ICD-10-CM

## 2020-10-21 RX ORDER — HYDROXYZINE HYDROCHLORIDE 25 MG/1
50 TABLET, FILM COATED ORAL EVERY 6 HOURS PRN
Qty: 60 TABLET | Refills: 3 | Status: SHIPPED | OUTPATIENT
Start: 2020-10-21 | End: 2021-03-10

## 2020-10-21 RX ORDER — LISINOPRIL AND HYDROCHLOROTHIAZIDE 20; 25 MG/1; MG/1
TABLET ORAL
Qty: 30 TABLET | Refills: 0 | Status: SHIPPED | OUTPATIENT
Start: 2020-10-21 | End: 2020-11-25

## 2020-10-21 RX ORDER — SERTRALINE HYDROCHLORIDE 100 MG/1
TABLET, FILM COATED ORAL
Qty: 45 TABLET | Refills: 0 | Status: SHIPPED | OUTPATIENT
Start: 2020-10-21 | End: 2020-11-02

## 2020-10-21 RX ORDER — TEMAZEPAM 30 MG
CAPSULE ORAL
Qty: 30 CAPSULE | Refills: 0 | Status: SHIPPED | OUTPATIENT
Start: 2020-10-21 | End: 2020-10-26 | Stop reason: ALTCHOICE

## 2020-10-21 NOTE — TELEPHONE ENCOUNTER
lisinopril-hydrochlorothiazide (ZESTORETIC) 20-25 MG tablet [Pharmacy Med Name: Lisinopril-hydroCHLOROthiazide 20-25 MG Oral Tablet] 30 tablet 0    Sig: Take 1 tablet by mouth once daily   Routing refill request to provider for review/approval because:  Labs not current:  CR, Potassium, NA  Last taken 11/6/2015  T'd up 1 month for provider review.      sertraline (ZOLOFT) 100 MG tablet [Pharmacy Med Name: Sertraline HCl 100 MG Oral Tablet] 45 tablet 0    Sig: TAKE 1 & 1/2 (ONE & ONE-HALF) TABLETS BY MOUTH ONCE DAILY   Routing refill request to provider for review/approval because:  PHQ-9 score:    PHQ 10/5/2020   PHQ-9 Total Score -   Q9: Thoughts of better off dead/self-harm past 2 weeks -   PHQ-9 External Data 12   Routing refill request to provider for review/approval because:  PHQ9 score not in range  T'd up 1 month for provider review.      temazepam (RESTORIL) 30 MG capsule [Pharmacy Med Name: Temazepam 30 MG Oral Capsule] 30 capsule 0    Sig: TAKE 1 CAPSULE BY MOUTH NIGHTLY AS NEEDED FOR SLEEP   Last Written Prescription Date:  9/28/2020  Last Fill Quantity: 30,  # refills: 0   Last office visit: 9/15/2020 with prescribing provider:     Future Office Visit:    Routing refill request to provider for review/approval because:  Drug not on the [facility] refill protocol       Kacey Odonnell RN

## 2020-10-21 NOTE — TELEPHONE ENCOUNTER
"Requested Prescriptions   Pending Prescriptions Disp Refills     lisinopril-hydrochlorothiazide (ZESTORETIC) 20-25 MG tablet [Pharmacy Med Name: Lisinopril-hydroCHLOROthiazide 20-25 MG Oral Tablet] 30 tablet 0     Sig: Take 1 tablet by mouth once daily   Last Written Prescription Date:  4/13/2020  Last Fill Quantity: 30,  # refills: 3   Last office visit: 9/15/2020 with prescribing provider:     Future Office Visit:        Diuretics (Including Combos) Protocol Failed - 10/21/2020 11:18 AM        Failed - Normal serum creatinine on file in past 12 months     Recent Labs   Lab Test 11/06/15  1426   CR 0.69           Failed - Normal serum potassium on file in past 12 months     Recent Labs   Lab Test 11/06/15  1426   POTASSIUM 3.8            Failed - Normal serum sodium on file in past 12 months     Recent Labs   Lab Test 11/06/15  1426               Passed - Blood pressure under 140/90 in past 12 months     BP Readings from Last 3 Encounters:   10/02/20 118/80   07/06/20 110/76   02/18/20 123/86           Passed - Recent (12 mo) or future (30 days) visit within the authorizing provider's specialty     Patient has had an office visit with the authorizing provider or a provider within the authorizing providers department within the previous 12 mos or has a future within next 30 days. See \"Patient Info\" tab in inbasket, or \"Choose Columns\" in Meds & Orders section of the refill encounter.            Passed - Medication is active on med list        Passed - Patient is age 18 or older        Passed - No active pregancy on record        Passed - No positive pregnancy test in past 12 months       ACE Inhibitors (Including Combos) Protocol Failed - 10/21/2020 11:18 AM        Failed - Normal serum creatinine on file in past 12 months     Recent Labs   Lab Test 11/06/15  1426   CR 0.69     Ok to refill medication if creatinine is low          Failed - Normal serum potassium on file in past 12 months     Recent Labs   Lab " "Test 11/06/15  1426   POTASSIUM 3.8           Passed - Blood pressure under 140/90 in past 12 months     BP Readings from Last 3 Encounters:   10/02/20 118/80   07/06/20 110/76   02/18/20 123/86           Passed - Recent (12 mo) or future (30 days) visit within the authorizing provider's specialty     Patient has had an office visit with the authorizing provider or a provider within the authorizing providers department within the previous 12 mos or has a future within next 30 days. See \"Patient Info\" tab in inbasket, or \"Choose Columns\" in Meds & Orders section of the refill encounter.              Passed - Medication is active on med list        Passed - Patient is age 18 or older        Passed - No active pregnancy on record        Passed - No positive pregnancy test within past 12 months      Routing refill request to provider for review/approval          omeprazole (PRILOSEC) 20 MG DR capsule [Pharmacy Med Name: Omeprazole 20 MG Oral Capsule Delayed Release] 30 capsule 0     Sig: Take 1 capsule by mouth once daily       PPI Protocol Passed - 10/21/2020 11:18 AM        Passed - Not on Clopidogrel (unless Pantoprazole ordered)        Passed - No diagnosis of osteoporosis on record        Passed - Recent (12 mo) or future (30 days) visit within the authorizing provider's specialty     Patient has had an office visit with the authorizing provider or a provider within the authorizing providers department within the previous 12 mos or has a future within next 30 days. See \"Patient Info\" tab in inPeerJet, or \"Choose Columns\" in Meds & Orders section of the refill encounter.              Passed - Medication is active on med list        Passed - Patient is age 18 or older        Passed - No active pregnacy on record        Passed - No positive pregnancy test in past 12 months      Prescription [place] per [facility] [place].       sertraline (ZOLOFT) 100 MG tablet [Pharmacy Med Name: Sertraline HCl 100 MG Oral Tablet] " "45 tablet 0     Sig: TAKE 1 & 1/2 (ONE & ONE-HALF) TABLETS BY MOUTH ONCE DAILY   Last Written Prescription Date:  9/25/2020  Last Fill Quantity: 45,  # refills: 0   Last office visit: 9/15/2020 with prescribing provider:     Future Office Visit:        SSRIs Protocol Failed - 10/21/2020 11:18 AM        Failed - PHQ-9 score less than 5 in past 6 months     Please review last PHQ-9 score.           Passed - Medication is active on med list        Passed - Patient is age 18 or older        Passed - No active pregnancy on record        Passed - No positive pregnancy test in last 12 months        Passed - Recent (6 mo) or future (30 days) visit within the authorizing provider's specialty     Patient had office visit in the last 6 months or has a visit in the next 30 days with authorizing provider or within the authorizing provider's specialty.  See \"Patient Info\" tab in inbasket, or \"Choose Columns\" in Meds & Orders section of the refill encounter.          Routing refill request to provider for review/approval        temazepam (RESTORIL) 30 MG capsule [Pharmacy Med Name: Temazepam 30 MG Oral Capsule] 30 capsule 0     Sig: TAKE 1 CAPSULE BY MOUTH NIGHTLY AS NEEDED FOR SLEEP       There is no refill protocol information for this order   Routing refill request to provider for review/approval          hydrOXYzine (ATARAX) 25 MG tablet [Pharmacy Med Name: hydrOXYzine HCl 25 MG Oral Tablet] 30 tablet 0     Sig: Take 1 tablet by mouth once daily       Antihistamines Protocol Passed - 10/21/2020 11:18 AM        Passed - Recent (12 mo) or future (30 days) visit within the authorizing provider's specialty     Patient has had an office visit with the authorizing provider or a provider within the authorizing providers department within the previous 12 mos or has a future within next 30 days. See \"Patient Info\" tab in inbasket, or \"Choose Columns\" in Meds & Orders section of the refill encounter.              Passed - Patient is age " 3 or older     Apply age and/or weight-based dosing for peds patients age 3 and older.    Forward request to provider for patients under the age of 3.          Passed - Medication is active on med list         Prescription approved per [facility] Refill Protocol.  Kacey Odonnell RN

## 2020-10-26 ENCOUNTER — VIRTUAL VISIT (OUTPATIENT)
Dept: FAMILY MEDICINE | Facility: CLINIC | Age: 60
End: 2020-10-26
Payer: COMMERCIAL

## 2020-10-26 DIAGNOSIS — G47.00 PERSISTENT INSOMNIA: Primary | ICD-10-CM

## 2020-10-26 DIAGNOSIS — E78.5 HYPERLIPIDEMIA LDL GOAL <130: ICD-10-CM

## 2020-10-26 DIAGNOSIS — M47.22 OSTEOARTHRITIS OF SPINE WITH RADICULOPATHY, CERVICAL REGION: ICD-10-CM

## 2020-10-26 DIAGNOSIS — M25.551 HIP PAIN, RIGHT: ICD-10-CM

## 2020-10-26 DIAGNOSIS — Z85.3 PERSONAL HISTORY OF MALIGNANT NEOPLASM OF BREAST: ICD-10-CM

## 2020-10-26 DIAGNOSIS — I10 ESSENTIAL HYPERTENSION: ICD-10-CM

## 2020-10-26 DIAGNOSIS — F41.8 DEPRESSION WITH ANXIETY: ICD-10-CM

## 2020-10-26 DIAGNOSIS — F41.1 ANXIETY STATE: ICD-10-CM

## 2020-10-26 DIAGNOSIS — M47.26 OSTEOARTHRITIS OF SPINE WITH RADICULOPATHY, LUMBAR REGION: ICD-10-CM

## 2020-10-26 PROCEDURE — 99214 OFFICE O/P EST MOD 30 MIN: CPT | Mod: 95 | Performed by: FAMILY MEDICINE

## 2020-10-26 RX ORDER — ATORVASTATIN CALCIUM 10 MG/1
10 TABLET, FILM COATED ORAL
COMMUNITY
Start: 2019-05-23

## 2020-10-26 RX ORDER — BACLOFEN 10 MG/1
TABLET ORAL
COMMUNITY
Start: 2020-10-02 | End: 2021-01-26

## 2020-10-26 RX ORDER — ZOLPIDEM TARTRATE 6.25 MG/1
6.25 TABLET, FILM COATED, EXTENDED RELEASE ORAL
Qty: 30 TABLET | Refills: 0 | Status: SHIPPED | OUTPATIENT
Start: 2020-10-26 | End: 2021-01-26

## 2020-10-26 RX ORDER — AMITRIPTYLINE HYDROCHLORIDE 10 MG/1
TABLET ORAL
COMMUNITY
Start: 2020-10-05 | End: 2021-01-26

## 2020-10-26 NOTE — PROGRESS NOTES
"Nicole Franco is a 59 year old female who is being evaluated via a billable video visit.      Video visit performed in lieu of an in-person visit due to current concerns regarding the current COVID-19 situation including social distancing and keeping at risk people safe.  Patient agreed to proceed with this visit due to these circumstances.       Chief Complaint   Patient presents with     Establish Care     patient would like to establish care and discuss MRI results at [facility]       The patient has been notified of following:     \"This video visit will be conducted via a call between you and your physician/provider. We have found that certain health care needs can be provided without the need for an in-person physical exam.  This service lets us provide the care you need with a video conversation.  If a prescription is necessary we can send it directly to your pharmacy.  If lab work is needed we can place an order for that and you can then stop by our lab to have the test done at a later time.     Video visits are billed at different rates depending on your insurance coverage.  Please reach out to your insurance provider with any questions.    If during the course of the call the physician/provider feels a video visit is not appropriate, you will not be charged for this service.\"    Patient has given verbal consent for Video visit? Yes  How would you like to obtain your AVS? Mail a copy  If you are dropped from the video visit, the video invite should be resent to: Text to cell phone: [phone]  Will anyone else be joining your video visit? No    Subjective     Nicole Franco is a 59 year old female who presents today via video visit for the following health issues:    HPI     To establish care with new MD, as previous one moved. She would like to discuss her medications.     Video Start Time: 2:25 PM    She has a history of chronic back pain for many years, affecting the neck and low back, as well as " both legs and also primary osteoarthritis of the left hip.  She is currently being treated by [facility]. She has another appt tomorrow. She has been through numerous injections, and states she has tried many things for pain including gabapentin, amitriptyline, baclofen, tizanidine, flexeril, lidocaine, icy hot, TENS unit.  She was recently put back on amitriptyline 30 mg at night along with baclofen but she states she can only take 10 mg of amitriptyline and 1 Baclofen because anything higher dose makes her too spacey. She states she has never had injections in her neck, and the injections in her lower back and hip haven't really helped much. She uses a walker, otherwise her legs give out on her.     She lives in [place].  She requests me to be her primary MD as she needs a new one since her previous provider left her practice.   She needs something for sleep. She is currently on Temazepam, and it's not that helpful. In the past, her doctor would rotate her between Temazepam and Ambien CR, switching off every so often would keep them working better. She is requesting to switch now to the Ambien, since she hasn't picked up her most recent refill of the Temazepam. Her pain keeps her awake at night, as does her anxiety/depression.      She is in need of a physical as well, has a history of breast cancer and her last mammo/pap/physical was in 5/19.      Review of Systems   10 pt ROS is otherwise negative except as noted in HPI.        Objective           Vitals:  No vitals were obtained today due to virtual visit.    Physical Exam     GENERAL: Healthy, alert and no distress  EYES: Eyes grossly normal to inspection.  No discharge or erythema, or obvious scleral/conjunctival abnormalities.  RESP: No audible wheeze, cough, or visible cyanosis.  No visible retractions or increased work of breathing.    SKIN: Visible skin clear. No significant rash, abnormal pigmentation or lesions.  NEURO:  Cranial nerves grossly intact.  Mentation and speech appropriate for age.  PSYCH: Mentation appears normal, affect normal/bright, judgement and insight intact, normal speech and appearance well-groomed.      I reviewed her MRI reports from cervical spine from August:  MRI CERVICAL SPINE WITHOUT CONTRAST 8/14/2020 2:21 PM       FINDINGS: There is new mild anterolisthesis of C4 on C5 measuring 2  mm. There is straightening of the cervical lordosis along the lower  aspect of the cervical spine. Alignment is otherwise normal in the  sagittal plane. No fracture. Modic type I degenerative endplate change  at C5-C6 and to a lesser degree at C6-C7. Mild patchy T2/STIR  hyperintense signal in the brainstem is presumably related to chronic  small vessel ischemic changes, although incompletely evaluated. No  cord signal abnormality. The paraspinous soft tissues are  unremarkable.     Segmental analysis:  Craniocervical junction/C1-C2: Mild degenerative changes at the median  atlantoaxial joint. Otherwise unremarkable.     C2-C3: Normal disc height. No significant disc bulge or herniation.  Normal facets. No spinal canal or neural foraminal stenosis.     C3-C4: Mild disc height loss. Small symmetric disc bulge. Moderate  left facet arthropathy, with associated small facet joint effusion.  The right facet joint appears normal. No spinal stenosis. Mild  bilateral neural foraminal stenosis.     C4-C5: Mild disc height loss. Small symmetric disc bulge. Mild right  and moderate to severe left facet arthropathy. No spinal stenosis.  Mild bilateral neural foraminal stenosis.     C5-C6: Moderate disc height loss. There is a diffuse disc bulge  eccentric to the right with right more than left uncovertebral  arthropathy and mild-to-moderate bilateral facet arthropathy.  Ligamentum flavum thickening. Mild spinal stenosis. Severe right and  mild-to-moderate left neural foraminal stenosis.     C6-C7: Moderate-to-severe disc height loss.  Diffuse disc bulge with  bilateral uncovertebral arthropathy and mild bilateral facet  arthropathy. No spinal stenosis. Mild mass effect on the ventral  thecal sac by the disc osteophyte complex. Moderate left and mild  right neural foraminal stenosis.     C7-T1: Normal disc height. No significant disc bulge or herniation.  Normal facets. No spinal canal or neural foraminal stenosis.                                                                      IMPRESSION:  Multilevel degenerative disc disease and uncovertebral and facet  arthropathy of the cervical spine, as described. The most significant  findings are seen at C5-C6 and C6-C7. Please see the body of report  for details.        Assessment & Plan       ICD-10-CM    1. Persistent insomnia  G47.00 zolpidem ER (AMBIEN CR) 6.25 MG CR tablet     Comprehensive metabolic panel (BMP + Alb, Alk Phos, ALT, AST, Total. Bili, TP)   2. Osteoarthritis of spine with radiculopathy, lumbar region  M47.26 Comprehensive metabolic panel (BMP + Alb, Alk Phos, ALT, AST, Total. Bili, TP)   3. Osteoarthritis of spine with radiculopathy, cervical region  M47.22 Comprehensive metabolic panel (BMP + Alb, Alk Phos, ALT, AST, Total. Bili, TP)   4. Hip pain, right  M25.551 Comprehensive metabolic panel (BMP + Alb, Alk Phos, ALT, AST, Total. Bili, TP)   5. Essential hypertension  I10 Comprehensive metabolic panel (BMP + Alb, Alk Phos, ALT, AST, Total. Bili, TP)   6. Depression with anxiety  F41.8 Comprehensive metabolic panel (BMP + Alb, Alk Phos, ALT, AST, Total. Bili, TP)   7. Anxiety state  F41.1 Comprehensive metabolic panel (BMP + Alb, Alk Phos, ALT, AST, Total. Bili, TP)   8. Hyperlipidemia LDL goal <130  E78.5 Lipid panel reflex to direct LDL Fasting     Comprehensive metabolic panel (BMP + Alb, Alk Phos, ALT, AST, Total. Bili, TP)   9. Personal history of malignant neoplasm of breast  Z85.3 MA Screen Left w/Willie         Tobacco Cessation:   reports that she has been smoking. She  "has been smoking about 0.50 packs per day. She has never used smokeless tobacco.  Tobacco Cessation Action Plan: not addressed today      BMI:   Estimated body mass index is 27.11 kg/m  as calculated from the following:    Height as of 7/6/20: 1.67 m (5' 5.75\").    Weight as of 10/2/20: 75.6 kg (166 lb 11.2 oz).     I agreed to be her primary MD and manage her other medical issues alongside her spine/back/pain doctor. I told her that I would not manage her back/neck/hip issues at this time as she is seeing a specialist, and should have one treating MD. However, I will address her other medical issues starting with a physical in the next couple months. She will be due for fasting labs as well. Will order her mammogram as well.     I did agree to switch her sleep medication at her request, since she hasn't yet picked up her Temazepam. See orders for Ambien, advised to not use both.     No follow-ups on file.    Myrna Casillas MD  [facility]      Video-Visit Details    Type of service:  Video Visit    Video End Time:14:54    Originating Location (pt. Location): Home    Distant Location (provider location):  [facility]     Platform used for Video Visit: [employer]  And ended with telephone call, as video call stopped working.         "

## 2020-10-28 ENCOUNTER — TELEPHONE (OUTPATIENT)
Dept: FAMILY MEDICINE | Facility: CLINIC | Age: 60
End: 2020-10-28

## 2020-10-28 DIAGNOSIS — G47.00 PERSISTENT INSOMNIA: Primary | ICD-10-CM

## 2020-10-28 NOTE — TELEPHONE ENCOUNTER
Central Prior Authorization Team  Phone: [phone]    PA Initiation    Medication: zolpidem ER (AMBIEN CR) 6.25 MG CR tablet  Insurance Company: [employer] [facility] - Phone [phone] Fax [phone]  Pharmacy Filling the Rx: [facility] [address] [place], [place] - [address]  Filling Pharmacy Phone: [phone]  Filling Pharmacy Fax:    Start Date: 10/28/2020

## 2020-10-28 NOTE — TELEPHONE ENCOUNTER
Prior Authorization Retail Medication Request-KEY [id number]    Medication/Dose: zolpidem ER (AMBIEN CR) 6.25 MG CR tablet  ICD code (if different than what is on RX):    Previously Tried and Failed:    Rationale:      Insurance Name:  Orly MEYER  Insurance ID:  [id number]      Pharmacy Information (if different than what is on RX)  Name:    Phone:

## 2020-10-29 NOTE — TELEPHONE ENCOUNTER
PRIOR AUTHORIZATION DENIED    Medication: zolpidem ER (AMBIEN CR) 6.25 MG CR tablet- DENIED     Denial Date: 10/29/2020    Denial Rational: Patient must have a history of trial & failure to 2 formulary alternatives or have a contraindication or intolerance to the formulary alternatives:      Appeal Information: If provider would like to appeal please provide a letter of medical necessity.

## 2020-10-30 RX ORDER — ZALEPLON 10 MG/1
10 CAPSULE ORAL AT BEDTIME
Qty: 30 CAPSULE | Refills: 1 | Status: SHIPPED | OUTPATIENT
Start: 2020-10-30 | End: 2020-11-29

## 2020-10-30 NOTE — TELEPHONE ENCOUNTER
Please let patient know a new refill for her sleep medication has been placed. Her insurance will not cover the ambien.     I put order in for 10 mg for Zaleplon. Take 1 tablet at bedtime to help with the insomnia.     Odilon Fuentes CNP

## 2020-10-31 DIAGNOSIS — F41.8 DEPRESSION WITH ANXIETY: ICD-10-CM

## 2020-11-02 DIAGNOSIS — N39.41 URGENCY INCONTINENCE: ICD-10-CM

## 2020-11-02 RX ORDER — SERTRALINE HYDROCHLORIDE 100 MG/1
150 TABLET, FILM COATED ORAL DAILY
Qty: 135 TABLET | Refills: 0 | Status: SHIPPED | OUTPATIENT
Start: 2020-11-02 | End: 2021-02-12

## 2020-11-02 NOTE — TELEPHONE ENCOUNTER
"Routing refill request to provider for review/approval because:  PHQ9 score out of range  T'd up 1 month and 2 refills for provider review.    Requested Prescriptions   Pending Prescriptions Disp Refills     sertraline (ZOLOFT) 100 MG tablet [Pharmacy Med Name: Sertraline HCl 100 MG Oral Tablet] 45 tablet 0     Sig: TAKE 1 & 1/2 (ONE & ONE-HALF) TABLETS BY MOUTH ONCE DAILY   Last Written Prescription Date:  10/21/2020  Last Fill Quantity: 45,  # refills: 0   Last office visit: 10/26/2020    Future Office Visit:      SSRIs Protocol Failed - 10/31/2020 10:09 AM        Failed - PHQ-9 score less than 5 in past 6 months     Please review last PHQ-9 score.   PHQ-9 score:    PHQ 10/5/2020   PHQ-9 Total Score -   Q9: Thoughts of better off dead/self-harm past 2 weeks -   PHQ-9 External Data 12           Passed - Medication is active on med list        Passed - Patient is age 18 or older        Passed - No active pregnancy on record        Passed - No positive pregnancy test in last 12 months        Passed - Recent (6 mo) or future (30 days) visit within the authorizing provider's specialty     Patient had office visit in the last 6 months or has a visit in the next 30 days with authorizing provider or within the authorizing provider's specialty.  See \"Patient Info\" tab in inbasket, or \"Choose Columns\" in Meds & Orders section of the refill encounter.             Kacey Odonnell RN      "

## 2020-11-03 ENCOUNTER — TELEPHONE (OUTPATIENT)
Dept: FAMILY MEDICINE | Facility: CLINIC | Age: 60
End: 2020-11-03

## 2020-11-03 NOTE — TELEPHONE ENCOUNTER
Prior Authorization Retail Medication Request    Medication/Dose: omeprazole (PRILOSEC) 20 MG DR capsule  ICD code (if different than what is on RX):    Previously Tried and Failed:    Rationale:      Insurance Name:    Insurance ID:        Pharmacy Information (if different than what is on RX)  Name:    Phone:

## 2020-11-03 NOTE — TELEPHONE ENCOUNTER
PRIOR AUTHORIZATION DENIED    Medication: omeprazole (PRILOSEC) 20 MG DR capsule--DENIED    Denial Date: 11/3/2020    Denial Rational: Plan covers up to 30 capsules per 30 days for a maximum of 120 days within 365 days.     Appeal Information:

## 2020-11-03 NOTE — TELEPHONE ENCOUNTER
PA Initiation    Medication: omeprazole (PRILOSEC) 20 MG DR capsule  Insurance Company: [employer] [place] - Phone [phone] Fax [phone]  Pharmacy Filling the Rx: [facility] [address] [place] [address] [address]  Filling Pharmacy Phone: [phone]  Filling Pharmacy Fax: [phone]  Start Date: 11/3/2020

## 2020-11-04 RX ORDER — OXYBUTYNIN CHLORIDE 5 MG/1
TABLET ORAL
Qty: 120 TABLET | Refills: 1 | Status: SHIPPED | OUTPATIENT
Start: 2020-11-04 | End: 2021-01-12

## 2020-11-04 NOTE — TELEPHONE ENCOUNTER
Prescription approved per [facility] Refill Protocol.    GET PinedaN, RN  [facility]

## 2020-11-19 ENCOUNTER — HOSPITAL ENCOUNTER (OUTPATIENT)
Dept: MAMMOGRAPHY | Facility: CLINIC | Age: 60
Discharge: HOME | End: 2020-11-19
Attending: FAMILY MEDICINE | Admitting: FAMILY MEDICINE
Payer: COMMERCIAL

## 2020-11-19 DIAGNOSIS — Z85.3 PERSONAL HISTORY OF MALIGNANT NEOPLASM OF BREAST: ICD-10-CM

## 2020-11-19 PROCEDURE — 77063 BREAST TOMOSYNTHESIS BI: CPT | Mod: 52

## 2020-11-24 DIAGNOSIS — I10 ESSENTIAL HYPERTENSION: ICD-10-CM

## 2020-11-24 DIAGNOSIS — F51.01 PRIMARY INSOMNIA: ICD-10-CM

## 2020-11-24 DIAGNOSIS — F41.8 DEPRESSION WITH ANXIETY: ICD-10-CM

## 2020-11-25 RX ORDER — TEMAZEPAM 30 MG
CAPSULE ORAL
Qty: 30 CAPSULE | Refills: 0 | Status: SHIPPED | OUTPATIENT
Start: 2020-11-25 | End: 2020-12-22

## 2020-11-25 RX ORDER — SERTRALINE HYDROCHLORIDE 100 MG/1
TABLET, FILM COATED ORAL
Qty: 45 TABLET | Refills: 0 | OUTPATIENT
Start: 2020-11-25

## 2020-11-25 RX ORDER — LISINOPRIL AND HYDROCHLOROTHIAZIDE 20; 25 MG/1; MG/1
TABLET ORAL
Qty: 30 TABLET | Refills: 0 | Status: SHIPPED | OUTPATIENT
Start: 2020-11-25 | End: 2020-12-22

## 2020-11-25 NOTE — TELEPHONE ENCOUNTER
"Requested Prescriptions   Pending Prescriptions Disp Refills     sertraline (ZOLOFT) 100 MG tablet [Pharmacy Med Name: Sertraline HCl 100 MG Oral Tablet] 45 tablet 0     Sig: TAKE 1 & 1/2 (ONE & ONE-HALF) TABLETS BY MOUTH ONCE DAILY   Last Written Prescription Date:  11/2/2020  Last Fill Quantity: 135,  # refills: 0   Last office visit: 10/2/2020 with prescribing provider:     Future Office Visit:   Next 5 appointments (look out 90 days)    Jan 26, 2021  2:00 PM  PHYSICAL with Myrna Casillas MD  [facility] [facility] ([facility]) [address] [place] [id number] [phone]      Sent to this pharmacy 11/2/2020 for 3 months  Denied        SSRIs Protocol Failed - 11/24/2020  2:30 AM        Failed - PHQ-9 score less than 5 in past 6 months     Please review last PHQ-9 score.           Passed - Medication is active on med list        Passed - Patient is age 18 or older        Passed - No active pregnancy on record        Passed - No positive pregnancy test in last 12 months        Passed - Recent (6 mo) or future (30 days) visit within the authorizing provider's specialty     Patient had office visit in the last 6 months or has a visit in the next 30 days with authorizing provider or within the authorizing provider's specialty.  See \"Patient Info\" tab in inbasket, or \"Choose Columns\" in Meds & Orders section of the refill encounter.               lisinopril-hydrochlorothiazide (ZESTORETIC) 20-25 MG tablet [Pharmacy Med Name: Lisinopril-hydroCHLOROthiazide 20-25 MG Oral Tablet] 30 tablet 0     Sig: Take 1 tablet by mouth once daily       Diuretics (Including Combos) Protocol Failed - 11/24/2020  2:30 AM        Failed - Normal serum creatinine on file in past 12 months     Recent Labs   Lab Test 11/06/15  1426   CR 0.69              Failed - Normal serum potassium on file in past 12 months     Recent Labs   Lab Test 11/06/15  1426   POTASSIUM 3.8            Failed - " "Normal serum sodium on file in past 12 months     Recent Labs   Lab Test 11/06/15  1426                Passed - Blood pressure under 140/90 in past 12 months     BP Readings from Last 3 Encounters:   10/02/20 118/80   07/06/20 110/76   02/18/20 123/86           Passed - Recent (12 mo) or future (30 days) visit within the authorizing provider's specialty     Patient has had an office visit with the authorizing provider or a provider within the authorizing providers department within the previous 12 mos or has a future within next 30 days. See \"Patient Info\" tab in inbasket, or \"Choose Columns\" in Meds & Orders section of the refill encounter.            Passed - Medication is active on med list        Passed - Patient is age 18 or older        Passed - No active pregancy on record        Passed - No positive pregnancy test in past 12 months       ACE Inhibitors (Including Combos) Protocol Failed - 11/24/2020  2:30 AM        Failed - Normal serum creatinine on file in past 12 months     Recent Labs   Lab Test 11/06/15  1426   CR 0.69     Ok to refill medication if creatinine is low          Failed - Normal serum potassium on file in past 12 months     Recent Labs   Lab Test 11/06/15  1426   POTASSIUM 3.8           Passed - Blood pressure under 140/90 in past 12 months     BP Readings from Last 3 Encounters:   10/02/20 118/80   07/06/20 110/76   02/18/20 123/86           Passed - Recent (12 mo) or future (30 days) visit within the authorizing provider's specialty     Patient has had an office visit with the authorizing provider or a provider within the authorizing providers department within the previous 12 mos or has a future within next 30 days. See \"Patient Info\" tab in inbasket, or \"Choose Columns\" in Meds & Orders section of the refill encounter.            Passed - Medication is active on med list        Passed - Patient is age 18 or older        Passed - No active pregnancy on record        Passed - No " positive pregnancy test within past 12 months      Routing refill request to provider for review/approval          temazepam (RESTORIL) 30 MG capsule [Pharmacy Med Name: Temazepam 30 MG Oral Capsule] 30 capsule 0     Sig: TAKE 1 CAPSULE BY MOUTH NIGHTLY AS NEEDED FOR SLEEP       There is no refill protocol information for this order      Routing refill request to provider for review/approval   Kacey Odonnell RN

## 2020-11-25 NOTE — TELEPHONE ENCOUNTER
lisinopril-hydrochlorothiazide (ZESTORETIC) 20-25 MG tablet [Pharmacy Med Name: Lisinopril-hydroCHLOROthiazide 20-25 MG Oral Tablet] 30 tablet 0    Sig: Take 1 tablet by mouth once daily   Routing refill request to provider for review/approval because:  Labs not current:  CR, Potassium, NA  Last labs on 11/6/2015        temazepam (RESTORIL) 30 MG capsule [Pharmacy Med Name: Temazepam 30 MG Oral Capsule] 30 capsule 0    Sig: TAKE 1 CAPSULE BY MOUTH NIGHTLY AS NEEDED FOR SLEEP   Last Written Prescription Date:  NA  Last Fill Quantity: NA,  # refills: NA   Last office visit: 10/2/2020 with prescribing provider:     Future Office Visit:   Next 5 appointments (look out 90 days)    Jan 26, 2021  2:00 PM  PHYSICAL with Myrna Casillas MD  [facility] ([facility]) [address] [place] [id number] [phone]      Routing refill request to provider for review/approval because:  Drug not active on patient's medication list    Sending to scheduling for labs ordered as future at this time.    Kacey Odonnell, RN

## 2020-12-11 ENCOUNTER — ALLIED HEALTH/NURSE VISIT (OUTPATIENT)
Dept: FAMILY MEDICINE | Facility: CLINIC | Age: 60
End: 2020-12-11
Payer: COMMERCIAL

## 2020-12-11 DIAGNOSIS — Z23 NEED FOR PROPHYLACTIC VACCINATION AND INOCULATION AGAINST INFLUENZA: ICD-10-CM

## 2020-12-11 DIAGNOSIS — Z23 NEED FOR VACCINATION: Primary | ICD-10-CM

## 2020-12-11 PROCEDURE — 90472 IMMUNIZATION ADMIN EACH ADD: CPT

## 2020-12-11 PROCEDURE — 90714 TD VACC NO PRESV 7 YRS+ IM: CPT

## 2020-12-11 PROCEDURE — 90471 IMMUNIZATION ADMIN: CPT

## 2020-12-11 PROCEDURE — 99207 PR NO CHARGE NURSE ONLY: CPT

## 2020-12-11 PROCEDURE — 90682 RIV4 VACC RECOMBINANT DNA IM: CPT

## 2020-12-11 NOTE — PROGRESS NOTES
Prior to immunization administration, verified patients identity using patient s name and date of birth. Please see Immunization Activity for additional information.     Screening Questionnaire for Adult Immunization    Are you sick today?   No   Do you have allergies to medications, food, a vaccine component or latex?   Yes   Have you ever had a serious reaction after receiving a vaccination?   No   Do you have a long-term health problem with heart, lung, kidney, or metabolic disease (e.g., diabetes), asthma, a blood disorder, no spleen, complement component deficiency, a cochlear implant, or a spinal fluid leak?  Are you on long-term aspirin therapy?   No   Do you have cancer, leukemia, HIV/AIDS, or any other immune system problem?   Yes Cancer 2006   Do you have a parent, brother, or sister with an immune system problem?   Yes- Brother COPD   In the past 3 months, have you taken medications that affect  your immune system, such as prednisone, other steroids, or anticancer drugs; drugs for the treatment of rheumatoid arthritis, Crohn s disease, or psoriasis; or have you had radiation treatments?   Yes- Steroid injections last 6 months. (4)    Have you had a seizure, or a brain or other nervous system problem?   No   During the past year, have you received a transfusion of blood or blood    products, or been given immune (gamma) globulin or antiviral drug?   No   For women: Are you pregnant or is there a chance you could become       pregnant during the next month?   No   Have you received any vaccinations in the past 4 weeks?   No     Immunization questionnaire was positive for at least one answer.  Notified Disha Nieves NP.        Per orders of Disha Nieves NP , injection of Td, Flu vaccine  given by Keya Gutierrez CMA. Patient instructed to remain in clinic for 15 minutes afterwards, and to report any adverse reaction to me immediately.       Screening performed by Keya Gutierrez CMA on 12/11/2020 at  9:48 AM.

## 2020-12-20 DIAGNOSIS — F51.01 PRIMARY INSOMNIA: ICD-10-CM

## 2020-12-20 DIAGNOSIS — F41.8 DEPRESSION WITH ANXIETY: ICD-10-CM

## 2020-12-20 DIAGNOSIS — I10 ESSENTIAL HYPERTENSION: ICD-10-CM

## 2020-12-22 RX ORDER — LISINOPRIL AND HYDROCHLOROTHIAZIDE 20; 25 MG/1; MG/1
TABLET ORAL
Qty: 90 TABLET | Refills: 0 | Status: SHIPPED | OUTPATIENT
Start: 2020-12-22 | End: 2021-03-31

## 2020-12-22 RX ORDER — TEMAZEPAM 30 MG
CAPSULE ORAL
Qty: 30 CAPSULE | Refills: 0 | Status: SHIPPED | OUTPATIENT
Start: 2020-12-22 | End: 2021-01-18

## 2020-12-22 RX ORDER — BUSPIRONE HYDROCHLORIDE 10 MG/1
TABLET ORAL
Qty: 270 TABLET | Refills: 0 | Status: SHIPPED | OUTPATIENT
Start: 2020-12-22 | End: 2021-04-14

## 2020-12-22 NOTE — TELEPHONE ENCOUNTER
Temazepam      Last Written Prescription Date:  11/25/2020  Last Fill Quantity: 30,   # refills: 0  Last Office Visit: 10/2/2020  Future Office visit:    Next 5 appointments (look out 90 days)    Jan 26, 2021  2:00 PM  PHYSICAL with Myrna Casillas MD  [facility] ([facility]) [address] [place] [id number] [phone]           Routing refill request to provider for review/approval because:  Drug not on the G, P or [facility] refill protocol or controlled substance    Routing refill request to provider for review/approval because:  Labs not current:  Creatinine, Potassium, Sodium  PHQ-9 >4 (score of 12)    Cesia Pan, GETN, RN  [facility]

## 2020-12-31 DIAGNOSIS — M79.7 FIBROMYALGIA: ICD-10-CM

## 2020-12-31 DIAGNOSIS — G89.29 OTHER CHRONIC PAIN: ICD-10-CM

## 2020-12-31 RX ORDER — GABAPENTIN 300 MG/1
CAPSULE ORAL
Qty: 720 CAPSULE | Refills: 0 | Status: SHIPPED | OUTPATIENT
Start: 2020-12-31 | End: 2021-04-14

## 2020-12-31 RX ORDER — METHOCARBAMOL 750 MG/1
TABLET, FILM COATED ORAL
Qty: 90 TABLET | Refills: 0 | Status: SHIPPED | OUTPATIENT
Start: 2020-12-31 | End: 2021-02-12

## 2020-12-31 NOTE — TELEPHONE ENCOUNTER
gabapentin (NEURONTIN) 300 MG capsule      Last Written Prescription Date:  07/29/2020  Last Fill Quantity: 720,   # refills: 0  Last Office Visit: 10/02/2020  Future Office visit:    Next 5 appointments (look out 90 days)    Jan 26, 2021  2:00 PM  PHYSICAL with Myrna Casillas MD  [facility] ([facility]) [address] [place] [phone]  [phone]           Routing refill request to provider for review/approval because:  Drug not on the [facility], [facility] or [facility] refill protocol or controlled substance    methocarbamol (ROBAXIN) 750 MG tablet    Last Written Prescription Date:  04/13/2020  Last Fill Quantity: 90,   # refills: 3  Last Office Visit: 10/02/2020  Future Office visit:    Next 5 appointments (look out 90 days)    Jan 26, 2021  2:00 PM  PHYSICAL with Myrna Casillas MD  [facility] ([facility]) [address] [place] [phone]  [phone]           Routing refill request to provider for review/approval because:  Drug not on the [facility], [facility] or [facility] refill protocol or controlled substance

## 2021-01-11 ENCOUNTER — TRANSFERRED RECORDS (OUTPATIENT)
Dept: HEALTH INFORMATION MANAGEMENT | Facility: CLINIC | Age: 61
End: 2021-01-11

## 2021-01-12 DIAGNOSIS — E78.5 HYPERLIPIDEMIA LDL GOAL <100: ICD-10-CM

## 2021-01-12 DIAGNOSIS — N39.41 URGENCY INCONTINENCE: ICD-10-CM

## 2021-01-12 RX ORDER — OXYBUTYNIN CHLORIDE 5 MG/1
TABLET ORAL
Qty: 120 TABLET | Refills: 1 | Status: SHIPPED | OUTPATIENT
Start: 2021-01-12 | End: 2021-03-15

## 2021-01-12 NOTE — TELEPHONE ENCOUNTER
Ditropan  Prescription approved per [facility] Refill Protocol.    Kristin Diaz RN

## 2021-01-12 NOTE — TELEPHONE ENCOUNTER
Lopid  Routing refill request to provider for review/approval because:  Labs not current:  Lipid panel.     Kristin Diaz RN

## 2021-01-13 RX ORDER — GEMFIBROZIL 600 MG/1
TABLET, FILM COATED ORAL
Qty: 60 TABLET | Refills: 0 | Status: SHIPPED | OUTPATIENT
Start: 2021-01-13 | End: 2021-02-12

## 2021-01-18 DIAGNOSIS — F51.01 PRIMARY INSOMNIA: ICD-10-CM

## 2021-01-18 RX ORDER — TEMAZEPAM 30 MG
CAPSULE ORAL
Qty: 30 CAPSULE | Refills: 0 | Status: SHIPPED | OUTPATIENT
Start: 2021-01-18 | End: 2021-02-16

## 2021-01-18 NOTE — TELEPHONE ENCOUNTER
Requested Prescriptions   Pending Prescriptions Disp Refills     temazepam (RESTORIL) 30 MG capsule [Pharmacy Med Name: Temazepam 30 MG Oral Capsule] 30 capsule 0     Sig: TAKE 1 CAPSULE BY MOUTH NIGHTLY AS NEEDED FOR SLEEP     Last Written Prescription Date:  12/22/2020  Last Fill Quantity: 30,   # refills: 0  Last Office Visit: 10/26/2020  Future Office visit:    Next 5 appointments (look out 90 days)    Jan 26, 2021  2:00 PM  PHYSICAL with Myrna Casillas MD  [facility] ([address] [phone]           Routing refill request to provider for review/approval because:  Drug not on the [facility], [facility] or [facility] refill protocol or controlled substance    Keya Gutierrez MA

## 2021-01-20 ENCOUNTER — VIRTUAL VISIT (OUTPATIENT)
Dept: URGENT CARE | Facility: CLINIC | Age: 61
End: 2021-01-20
Payer: COMMERCIAL

## 2021-01-20 ENCOUNTER — NURSE TRIAGE (OUTPATIENT)
Dept: NURSING | Facility: CLINIC | Age: 61
End: 2021-01-20

## 2021-01-20 DIAGNOSIS — R06.02 SHORTNESS OF BREATH: ICD-10-CM

## 2021-01-20 DIAGNOSIS — R07.89 CHEST TIGHTNESS: Primary | ICD-10-CM

## 2021-01-20 DIAGNOSIS — Z20.822 EXPOSURE TO COVID-19 VIRUS: ICD-10-CM

## 2021-01-20 PROCEDURE — 99215 OFFICE O/P EST HI 40 MIN: CPT | Mod: 95 | Performed by: NURSE PRACTITIONER

## 2021-01-20 NOTE — TELEPHONE ENCOUNTER
Hoda is calling and states that she was exposed to covid.  Hoda has noticed more tightening in the chest.  Hoda was exposed on Jan 16.  Hoda is requesting a virtual visit for covid.    COVID 19 Nurse Triage Plan/Patient Instructions    Please be aware that novel coronavirus (COVID-19) may be circulating in the community. If you develop symptoms such as fever, cough, or SOB or if you have concerns about the presence of another infection including coronavirus (COVID-19), please contact your health care provider or visit [email or web address].     Disposition/Instructions    Virtual Visit with provider recommended. Reference Visit Selection Guide.    Thank you for taking steps to prevent the spread of this virus.  o Limit your contact with others.  o Wear a simple mask to cover your cough.  o Wash your hands well and often.    Resources    [facility]: About COVID-19: [email or web address]    CDC: What to Do If You're Sick: www.cdc.gov/coronavirus/2019-ncov/about/steps-when-sick.html    CDC: Ending Home Isolation: www.cdc.gov/coronavirus/2019-ncov/hcp/disposition-in-home-patients.html     CDC: Caring for Someone: www.cdc.gov/coronavirus/2019-ncov/if-you-are-sick/care-for-someone.html     [facility]: Interim Guidance for Hospital Discharge to Home: www.health.[facility].[place]/diseases/coronavirus/hcp/hospdischarge.pdf    [facility] clinical trials (COVID-19 research studies): clinicalaffairs.[facility]-clinical-trials     Below are the COVID-19 hotlines at the [facility] of [facility] ([facility]). Interpreters are available.   o For health questions: Call [phone] or 1-901.127.5477 (7 a.m. to 7 p.m.)  o For questions about schools and childcare: Call [phone] or 1-728.989.9721 (7 a.m. to 7 p.m.)                       Reason for Disposition    [1] COVID-19 infection suspected by caller or triager AND [2] mild symptoms (cough, fever, or others) AND [3] no complications or SOB    Additional  Information    Negative: SEVERE difficulty breathing (e.g., struggling for each breath, speaks in single words)    Negative: Difficult to awaken or acting confused (e.g., disoriented, slurred speech)    Negative: Bluish (or gray) lips or face now    Negative: Shock suspected (e.g., cold/pale/clammy skin, too weak to stand, low BP, rapid pulse)    Negative: Sounds like a life-threatening emergency to the triager    Negative: SEVERE or constant chest pain or pressure (Exception: mild central chest pain, present only when coughing)    Negative: MODERATE difficulty breathing (e.g., speaks in phrases, SOB even at rest, pulse 100-120)    Negative: [1] Headache AND [2] stiff neck (can't touch chin to chest)    Negative: MILD difficulty breathing (e.g., minimal/no SOB at rest, SOB with walking, pulse <100)    Negative: Chest pain or pressure    Negative: Patient sounds very sick or weak to the triager    Negative: Fever > 103 F (39.4 C)    Negative: [1] Fever > 101 F (38.3 C) AND [2] age > 60    Negative: [1] Fever > 100.0 F (37.8 C) AND [2] bedridden (e.g., nursing home patient, CVA, chronic illness, recovering from surgery)    Negative: [1] HIGH RISK patient (e.g., age > 64 years, diabetes, heart or lung disease, weak immune system) AND [2] new or worsening symptoms    Negative: [1] HIGH RISK patient AND [2] influenza is widespread in the community AND [3] ONE OR MORE respiratory symptoms: cough, sore throat, runny or stuffy nose    Negative: [1] HIGH RISK patient AND [2] influenza exposure within the last 7 days AND [3] ONE OR MORE respiratory symptoms: cough, sore throat, runny or stuffy nose    Negative: Fever present > 3 days (72 hours)    Negative: [1] Fever returns after gone for over 24 hours AND [2] symptoms worse or not improved    Negative: [1] Continuous (nonstop) coughing interferes with work or school AND [2] no improvement using cough treatment per protocol    Protocols used: CORONAVIRUS (COVID-19) DIAGNOSED  OR [id number] 12.1

## 2021-01-20 NOTE — PROGRESS NOTES
Nicole Franco is a 60 year old who is being evaluated via a billable telephone visit.        ICD-10-CM    1. Chest tightness  R07.89 Symptomatic COVID-19 Virus (Coronavirus) by PCR   2. Shortness of breath  R06.02 Symptomatic COVID-19 Virus (Coronavirus) by PCR   3. Exposure to COVID-19 virus  Z20.822 Symptomatic COVID-19 Virus (Coronavirus) by PCR   Patient advised to go to the ER for further evaluation immediately but she declined. She expressed understanding of the risks including but not limited to PE, MI, and death.    Subjective     Nicole Franco reports chest tightness that worsened with activity yesterday and is still present today. She does have a dry scratchy throat and diarrhea. The diarrhea is not unusual for her. She is a smoker and has been having shortness of breath that is unusual for her.    She denies anosmia, ageusia, body aches, fevers, chills, cough, nausea, headache or ST. No lightheadedness.    Objective       Vitals:  No vitals were obtained today due to virtual visit.    Physical Exam   Alert, no acute distress  PSYCH: Alert and oriented times 3 normal affect  RESP: No cough, no audible wheezing, able to talk in full sentences    Remainder of exam unable to be completed due to telephone visits      Phone call duration: 15 minutes    22 minutes spent on the date of the encounter doing chart review, history and exam, documentation and further activities as noted above    BULL Esparza, CNP  [facility]

## 2021-01-21 DIAGNOSIS — R06.02 SHORTNESS OF BREATH: ICD-10-CM

## 2021-01-21 DIAGNOSIS — R07.89 CHEST TIGHTNESS: ICD-10-CM

## 2021-01-21 DIAGNOSIS — Z20.822 EXPOSURE TO COVID-19 VIRUS: ICD-10-CM

## 2021-01-21 PROCEDURE — U0005 INFEC AGEN DETEC AMPLI PROBE: HCPCS | Performed by: NURSE PRACTITIONER

## 2021-01-21 PROCEDURE — U0003 INFECTIOUS AGENT DETECTION BY NUCLEIC ACID (DNA OR RNA); SEVERE ACUTE RESPIRATORY SYNDROME CORONAVIRUS 2 (SARS-COV-2) (CORONAVIRUS DISEASE [COVID-19]), AMPLIFIED PROBE TECHNIQUE, MAKING USE OF HIGH THROUGHPUT TECHNOLOGIES AS DESCRIBED BY CMS-2020-01-R: HCPCS | Performed by: NURSE PRACTITIONER

## 2021-01-22 LAB
LABORATORY COMMENT REPORT: NORMAL
SARS-COV-2 RNA RESP QL NAA+PROBE: NEGATIVE
SARS-COV-2 RNA RESP QL NAA+PROBE: NORMAL
SPECIMEN SOURCE: NORMAL
SPECIMEN SOURCE: NORMAL

## 2021-01-23 ENCOUNTER — NURSE TRIAGE (OUTPATIENT)
Dept: NURSING | Facility: CLINIC | Age: 61
End: 2021-01-23

## 2021-01-23 NOTE — TELEPHONE ENCOUNTER

## 2021-01-26 ENCOUNTER — OFFICE VISIT (OUTPATIENT)
Dept: FAMILY MEDICINE | Facility: CLINIC | Age: 61
End: 2021-01-26
Payer: COMMERCIAL

## 2021-01-26 VITALS
SYSTOLIC BLOOD PRESSURE: 140 MMHG | TEMPERATURE: 98.7 F | HEIGHT: 65 IN | OXYGEN SATURATION: 97 % | DIASTOLIC BLOOD PRESSURE: 90 MMHG | WEIGHT: 161.6 LBS | RESPIRATION RATE: 18 BRPM | HEART RATE: 78 BPM | BODY MASS INDEX: 26.92 KG/M2

## 2021-01-26 DIAGNOSIS — G47.00 PERSISTENT INSOMNIA: ICD-10-CM

## 2021-01-26 DIAGNOSIS — M25.551 HIP PAIN, RIGHT: ICD-10-CM

## 2021-01-26 DIAGNOSIS — Z00.00 ROUTINE GENERAL MEDICAL EXAMINATION AT A HEALTH CARE FACILITY: Primary | ICD-10-CM

## 2021-01-26 DIAGNOSIS — I10 ESSENTIAL HYPERTENSION: ICD-10-CM

## 2021-01-26 DIAGNOSIS — Z13.820 SCREENING FOR OSTEOPOROSIS: ICD-10-CM

## 2021-01-26 DIAGNOSIS — E78.5 HYPERLIPIDEMIA LDL GOAL <130: ICD-10-CM

## 2021-01-26 DIAGNOSIS — M47.26 OSTEOARTHRITIS OF SPINE WITH RADICULOPATHY, LUMBAR REGION: ICD-10-CM

## 2021-01-26 DIAGNOSIS — F41.1 ANXIETY STATE: ICD-10-CM

## 2021-01-26 DIAGNOSIS — M47.22 OSTEOARTHRITIS OF SPINE WITH RADICULOPATHY, CERVICAL REGION: ICD-10-CM

## 2021-01-26 DIAGNOSIS — F41.8 DEPRESSION WITH ANXIETY: ICD-10-CM

## 2021-01-26 DIAGNOSIS — C50.919 MALIGNANT NEOPLASM OF FEMALE BREAST, UNSPECIFIED ESTROGEN RECEPTOR STATUS, UNSPECIFIED LATERALITY, UNSPECIFIED SITE OF BREAST (H): ICD-10-CM

## 2021-01-26 LAB
ALBUMIN SERPL-MCNC: 4.2 G/DL (ref 3.4–5)
ALP SERPL-CCNC: 112 U/L (ref 40–150)
ALT SERPL W P-5'-P-CCNC: 22 U/L (ref 0–50)
ANION GAP SERPL CALCULATED.3IONS-SCNC: 6 MMOL/L (ref 3–14)
AST SERPL W P-5'-P-CCNC: 28 U/L (ref 0–45)
BILIRUB SERPL-MCNC: 0.5 MG/DL (ref 0.2–1.3)
BUN SERPL-MCNC: 19 MG/DL (ref 7–30)
CALCIUM SERPL-MCNC: 9.8 MG/DL (ref 8.5–10.1)
CHLORIDE SERPL-SCNC: 100 MMOL/L (ref 94–109)
CHOLEST SERPL-MCNC: 187 MG/DL
CO2 SERPL-SCNC: 31 MMOL/L (ref 20–32)
CREAT SERPL-MCNC: 0.96 MG/DL (ref 0.52–1.04)
GFR SERPL CREATININE-BSD FRML MDRD: 64 ML/MIN/{1.73_M2}
GLUCOSE SERPL-MCNC: 76 MG/DL (ref 70–99)
HDLC SERPL-MCNC: 43 MG/DL
LDLC SERPL CALC-MCNC: 121 MG/DL
NONHDLC SERPL-MCNC: 144 MG/DL
POTASSIUM SERPL-SCNC: 3.1 MMOL/L (ref 3.4–5.3)
PROT SERPL-MCNC: 7.9 G/DL (ref 6.8–8.8)
SODIUM SERPL-SCNC: 137 MMOL/L (ref 133–144)
TRIGL SERPL-MCNC: 114 MG/DL

## 2021-01-26 PROCEDURE — 80061 LIPID PANEL: CPT | Performed by: FAMILY MEDICINE

## 2021-01-26 PROCEDURE — 36415 COLL VENOUS BLD VENIPUNCTURE: CPT | Performed by: FAMILY MEDICINE

## 2021-01-26 PROCEDURE — 99396 PREV VISIT EST AGE 40-64: CPT | Performed by: FAMILY MEDICINE

## 2021-01-26 PROCEDURE — 80053 COMPREHEN METABOLIC PANEL: CPT | Performed by: FAMILY MEDICINE

## 2021-01-26 SDOH — ECONOMIC STABILITY: TRANSPORTATION INSECURITY
IN THE PAST 12 MONTHS, HAS LACK OF TRANSPORTATION KEPT YOU FROM MEETINGS, WORK, OR FROM GETTING THINGS NEEDED FOR DAILY LIVING?: NOT ASKED

## 2021-01-26 SDOH — ECONOMIC STABILITY: TRANSPORTATION INSECURITY
IN THE PAST 12 MONTHS, HAS THE LACK OF TRANSPORTATION KEPT YOU FROM MEDICAL APPOINTMENTS OR FROM GETTING MEDICATIONS?: NOT ASKED

## 2021-01-26 SDOH — ECONOMIC STABILITY: TRANSPORTATION INSECURITY
IN THE PAST 12 MONTHS, HAS LACK OF TRANSPORTATION KEPT YOU FROM MEDICAL APPOINTMENTS OR FROM GETTING MEDICATIONS?: NOT ASKED

## 2021-01-26 ASSESSMENT — MIFFLIN-ST. JEOR: SCORE: 1298.89

## 2021-01-26 ASSESSMENT — PAIN SCALES - GENERAL: PAINLEVEL: EXTREME PAIN (9)

## 2021-01-26 ASSESSMENT — ACTIVITIES OF DAILY LIVING (ADL): LACK_OF_TRANSPORTATION: NOT ASKED

## 2021-01-26 ASSESSMENT — PATIENT HEALTH QUESTIONNAIRE - PHQ9: SUM OF ALL RESPONSES TO PHQ QUESTIONS 1-9: 10
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
## 2021-01-27 ENCOUNTER — NURSE TRIAGE (OUTPATIENT)
Dept: NURSING | Facility: CLINIC | Age: 61
End: 2021-01-27

## 2021-01-27 ENCOUNTER — VIRTUAL VISIT (OUTPATIENT)
Dept: URGENT CARE | Facility: CLINIC | Age: 61
End: 2021-01-27
Payer: COMMERCIAL

## 2021-01-27 DIAGNOSIS — K08.89 PAIN, DENTAL: Primary | ICD-10-CM

## 2021-01-27 PROCEDURE — 99213 OFFICE O/P EST LOW 20 MIN: CPT | Mod: 95 | Performed by: FAMILY MEDICINE

## 2021-01-27 RX ORDER — CLINDAMYCIN HCL 300 MG
300 CAPSULE ORAL 3 TIMES DAILY
Qty: 21 CAPSULE | Refills: 0 | Status: SHIPPED | OUTPATIENT
Start: 2021-01-27 | End: 2021-02-03

## 2021-01-27 NOTE — PROGRESS NOTES
SUBJECTIVE:  Nicole Franco is a 60 year old female here c/o tooth and jaw pain    Tooth has lost filling for a while  But since yesterday has gotten worse  Patient thinks the tooth is infected.  The home care nurse looked and saw the tooth     Patient able to eat and drink and swallow.       Problem list, Medication list, Allergies, and Medical/Social/Surgical histories reviewed in [facility] and updated as appropriate.      ROS:  Constitutional: NO fevers  ENT: as above  No fevers or chills chest pain or shortness of breath      OBJECTIVE:   not currently breastfeeding.  Eye exam : conjunctiva clear.  PSYCH: Alert and oriented times 3; coherent speech, normal   rate and volume, able to articulate logical thoughts, able   to abstract reason, no tangential thoughts, no hallucinations   or delusions  Her affect is normal  RESP: No cough, no audible wheezing, able to talk in full sentences  Additional exam:  none  Remainder of exam unable to be completed due to telephone visits    ASSESSMENT:        ICD-10-CM    1. Pain, dental  K08.89 clindamycin (CLEOCIN) 300 MG capsule         PLAN:  Patient reports some cheek swelling in that area where tooth is but denies any neck or jaw extension or trismus  Eating and drinking ok still  Prescribed with clindamycin. Aware of risk of c.diff with clindamycin. Aware to stop antibiotic immediately and come in to be seen if develop any diarrheal reaction. Alarm signs or symptoms discussed, if present recommend go to ER   Follow up with dentist within a few days  Adverse reactions to medications discussed  Aware to come back in if with worsening symptoms or concerns or no relief despite treatment plan  Please go to ER or come in to be seen immediately especially if with any difficulty swallowing or opening your mouth or worsening swelling.   Patient voiced understanding    Telephone visit: 6 minutes     Luisa Lopez MD

## 2021-01-27 NOTE — TELEPHONE ENCOUNTER
Dental pain/ cannot get into a dentist until the end of February/ thinks part of a lower fill fell out/ left jaw hurts and he face on that side is strting to swell/ pain started to swell yesterday and given home care and sent to scheduling to have an MD call her/given dental referral services to see if she can get in sooner   [phone]  1 800- dentist  And 1 [phone]  MAC Baird  Additional Information    Negative: Pale cold skin and very weak (can't stand)    Negative: Similar pain previously and it was from 'heart attack'    Negative: Similar pain previously and it was from 'angina' and not relieved by nitroglycerin    Negative: Sounds like a life-threatening emergency to the triager    Negative: Chest pain    Negative: Toothache followed tooth injury    Negative: Patient sounds very sick or weak to the triager    Face is swollen    Protocols used: TOOTHACHE-A-OH

## 2021-02-01 ENCOUNTER — HOSPITAL ENCOUNTER (OUTPATIENT)
Dept: BONE DENSITY | Facility: CLINIC | Age: 61
Discharge: HOME | End: 2021-02-01
Attending: FAMILY MEDICINE | Admitting: FAMILY MEDICINE
Payer: COMMERCIAL

## 2021-02-01 DIAGNOSIS — Z13.820 SCREENING FOR OSTEOPOROSIS: ICD-10-CM

## 2021-02-01 DIAGNOSIS — M47.22 OSTEOARTHRITIS OF SPINE WITH RADICULOPATHY, CERVICAL REGION: ICD-10-CM

## 2021-02-01 DIAGNOSIS — M25.551 HIP PAIN, RIGHT: ICD-10-CM

## 2021-02-01 DIAGNOSIS — M47.26 OSTEOARTHRITIS OF SPINE WITH RADICULOPATHY, LUMBAR REGION: ICD-10-CM

## 2021-02-01 PROCEDURE — 77080 DXA BONE DENSITY AXIAL: CPT

## 2021-02-03 ENCOUNTER — HOSPITAL ENCOUNTER (OUTPATIENT)
Dept: PHYSICAL THERAPY | Facility: CLINIC | Age: 61
Setting detail: THERAPIES SERIES
End: 2021-02-03
Attending: FAMILY MEDICINE
Payer: COMMERCIAL

## 2021-02-03 DIAGNOSIS — M25.551 HIP PAIN, RIGHT: ICD-10-CM

## 2021-02-03 DIAGNOSIS — M47.26 OSTEOARTHRITIS OF SPINE WITH RADICULOPATHY, LUMBAR REGION: ICD-10-CM

## 2021-02-03 DIAGNOSIS — M47.22 OSTEOARTHRITIS OF SPINE WITH RADICULOPATHY, CERVICAL REGION: ICD-10-CM

## 2021-02-03 PROCEDURE — 97162 PT EVAL MOD COMPLEX 30 MIN: CPT | Mod: GP | Performed by: PHYSICAL THERAPIST

## 2021-02-03 PROCEDURE — 97110 THERAPEUTIC EXERCISES: CPT | Mod: GP | Performed by: PHYSICAL THERAPIST

## 2021-02-03 NOTE — PROGRESS NOTES
[facility]        OUTPATIENT PHYSICAL THERAPY FUNCTIONAL EVALUATION  PLAN OF TREATMENT FOR OUTPATIENT REHABILITATION  (COMPLETE FOR INITIAL CLAIMS ONLY)  Patient's Last Name, First Name, M.I.  YOB: 1960  Nicole Franco  EVERARDO     Provider's Name   [facility]   Medical Record No.  [id number]     Start of Care Date:  02/03/21   Onset Date:  02/03/20   Type:     _X__PT   ____OT  ____SLP Medical Diagnosis:  OA of spine lumbar M47.26 OA of spine cervical M47.22 right hip pain M25.551     PT Diagnosis:  chronic pain in neck , low back and B hips  Visits from SOC:  1                              __________________________________________________________________________________  Plan of Treatment/Functional Goals:  ADL retraining, balance training, ROM, strengthening, stretching  pool therapy         GOALS  1  instruction in HEP and compliant with it 5 of 7 days to improve quality of life   05/03/21    2  patient to have improved quality and safety of gait currently tug 34 seconds goal is 20 and no falls   05/03/21    3  patient to have reduction in pain level currently 3-9/10 goal is 50% less 9/10   05/03/21                                                           Therapy Frequency:      Predicted Duration of Therapy Intervention:  1-2 x week x 1-3 months     Yazmin Kramer, PT                                    I CERTIFY THE NEED FOR THESE SERVICES FURNISHED UNDER        THIS PLAN OF TREATMENT AND WHILE UNDER MY CARE     (Physician co-signature of this document indicates review and certification of the therapy plan).                Certification Date From:  02/03/21   Certification Date To:  05/03/21    Referring Provider:  elpidio dick MD     Initial Assessment  See Epic Evaluation- Start of Care Date: 02/03/21
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
## 2021-02-05 ENCOUNTER — MYC MEDICAL ADVICE (OUTPATIENT)
Dept: FAMILY MEDICINE | Facility: CLINIC | Age: 61
End: 2021-02-05

## 2021-02-05 NOTE — TELEPHONE ENCOUNTER
Patient is informed via MyChart that she will need to be seen for this.  Will keep open to see if this was done.  If still not completed by Monday, we will call to discuss.    She is informed to go to the ED over the weekend if the pain is severe.  Vania Siddiqi, GETN, RN

## 2021-02-09 NOTE — TELEPHONE ENCOUNTER
Patient read my chart advice on 2/5/2021 at 6:49 pm with no response, does not appear to have been seen over the weekend or scheduled an appointment.                  Marlys Lerma RN  Triage Nurse  [facility], 24 hour nurse line, available by calling clinic at [phone] and following prompts.

## 2021-02-09 NOTE — TELEPHONE ENCOUNTER
Attempt # 1  Called patient at home number. [phone]  Was call answered?  No answer, left message to call nurse line at [phone] to schedule a virtual appointment.              Marlys Lerma RN  [facility]

## 2021-02-10 ENCOUNTER — TRANSFERRED RECORDS (OUTPATIENT)
Dept: HEALTH INFORMATION MANAGEMENT | Facility: CLINIC | Age: 61
End: 2021-02-10

## 2021-02-10 DIAGNOSIS — M79.7 FIBROMYALGIA: ICD-10-CM

## 2021-02-10 DIAGNOSIS — E78.5 HYPERLIPIDEMIA LDL GOAL <100: ICD-10-CM

## 2021-02-10 DIAGNOSIS — F41.8 DEPRESSION WITH ANXIETY: ICD-10-CM

## 2021-02-10 NOTE — TELEPHONE ENCOUNTER
Patient is asked how she is doing.  She is informed she can complete an Evisit or schedule a visit. Home Care instructions for low back pain have been sent vis [facility].  Will wait for a response.  GET AcostaN, RN

## 2021-02-12 RX ORDER — SERTRALINE HYDROCHLORIDE 100 MG/1
TABLET, FILM COATED ORAL
Qty: 135 TABLET | Refills: 0 | Status: SHIPPED | OUTPATIENT
Start: 2021-02-12 | End: 2021-04-14

## 2021-02-12 RX ORDER — METHOCARBAMOL 750 MG/1
TABLET, FILM COATED ORAL
Qty: 90 TABLET | Refills: 0 | Status: SHIPPED | OUTPATIENT
Start: 2021-02-12 | End: 2021-03-15

## 2021-02-12 RX ORDER — GEMFIBROZIL 600 MG/1
TABLET, FILM COATED ORAL
Qty: 180 TABLET | Refills: 1 | Status: SHIPPED | OUTPATIENT
Start: 2021-02-12 | End: 2021-07-30

## 2021-02-12 NOTE — TELEPHONE ENCOUNTER
Pending Prescriptions:                       Disp   Refills    sertraline (ZOLOFT) 100 MG tablet [Pharmac*135 ta*0        Sig: TAKE 1 & 1/2 (ONE & ONE-HALF) TABLETS BY MOUTH ONCE           DAILY    gemfibrozil (LOPID) 600 MG tablet [Pharmac*180 ta*1        Sig: TAKE 1 TABLET BY MOUTH TWICE DAILY BEFORE MEAL(S)    methocarbamol (ROBAXIN) 750 MG tablet [Pha*90 tab*0        Sig: TAKE 1 TABLET BY MOUTH THREE TIMES DAILY      Routing refill request to provider for review/approval because:  Drug not on the [facility] refill protocol   Drug interaction warning  Labs out of range:  phq9    Kaya Eldridge RN on 2/12/2021 at 12:57 PM

## 2021-02-13 DIAGNOSIS — F51.01 PRIMARY INSOMNIA: ICD-10-CM

## 2021-02-15 NOTE — TELEPHONE ENCOUNTER
Patient has been contacted 3 times with no response.  Closing this encounter.  Vania Siddiqi, BSN, RN

## 2021-02-16 ENCOUNTER — NURSE TRIAGE (OUTPATIENT)
Dept: NURSING | Facility: CLINIC | Age: 61
End: 2021-02-16

## 2021-02-16 ENCOUNTER — HOSPITAL ENCOUNTER (OUTPATIENT)
Dept: PHYSICAL THERAPY | Facility: CLINIC | Age: 61
Setting detail: THERAPIES SERIES
End: 2021-02-16
Attending: FAMILY MEDICINE
Payer: COMMERCIAL

## 2021-02-16 DIAGNOSIS — F51.01 PRIMARY INSOMNIA: ICD-10-CM

## 2021-02-16 PROCEDURE — 97113 AQUATIC THERAPY/EXERCISES: CPT | Mod: GP | Performed by: PHYSICAL THERAPIST

## 2021-02-16 RX ORDER — TEMAZEPAM 30 MG
CAPSULE ORAL
Qty: 30 CAPSULE | Refills: 0 | Status: SHIPPED | OUTPATIENT
Start: 2021-02-16 | End: 2021-03-15

## 2021-02-16 RX ORDER — TEMAZEPAM 30 MG
CAPSULE ORAL
Qty: 30 CAPSULE | Refills: 0 | Status: SHIPPED | OUTPATIENT
Start: 2021-02-16 | End: 2021-02-16

## 2021-02-16 NOTE — TELEPHONE ENCOUNTER
Pt is calling.    Requesting a refill of temazepam. She is out completely. Needs refill today.  Refill request was already sent to NP this AM.  Patient would like refill today.  Please contact patient when sent.    Suzy Hough RN  [facility]  2/16/2021 at 12:35 PM            Reason for Disposition    Request for URGENT new prescription or refill of 'essential' medication (i.e., likelihood of harm to patient if not taken) and triager unable to fill per department policy    Additional Information    Negative: Drug overdose and triager unable to answer question    Negative: Caller requesting information unrelated to medicine    Negative: Caller requesting a prescription for Strep throat and has a positive culture result    Negative: Rash while taking a medication or within 3 days of stopping it    Negative: Immunization reaction suspected    Negative: Asthma and having symptoms of asthma (cough, wheezing, etc.)    Negative: Breastfeeding questions about mother's medicines and diet    Negative: DOUBLE DOSE (an extra dose or lesser amount) of prescription drug and NO symptoms (Exception: a double dose of antibiotics)    Negative: Diabetes drug error or overdose (e.g., took wrong type of insulin or took extra dose)    Negative: Caller has medication question about med not prescribed by PCP and triager unable to answer question (e.g., compatibility with other med, storage)    Protocols used: MEDICATION QUESTION CALL-A-[place]

## 2021-02-16 NOTE — TELEPHONE ENCOUNTER
Requested Prescriptions   Pending Prescriptions Disp Refills     temazepam (RESTORIL) 30 MG capsule [Pharmacy Med Name: Temazepam 30 MG Oral Capsule] 30 capsule 0     Sig: TAKE 1 CAPSULE BY MOUTH NIGHTLY AS NEEDED FOR SLEEP     Last Written Prescription Date:  01/18/2021  Last Fill Quantity: 30,   # refills: 0  Last Office Visit: Last visit with prescribing provider 09/15/2020. Last office visit 01/26/2021 physical   Future Office visit:       Routing refill request to provider for review/approval because:  Drug not on the [facility], [facility] or [facility] refill protocol or controlled substance    Keya Gutierrez MA

## 2021-03-08 DIAGNOSIS — F41.8 DEPRESSION WITH ANXIETY: ICD-10-CM

## 2021-03-10 RX ORDER — HYDROXYZINE HYDROCHLORIDE 25 MG/1
50 TABLET, FILM COATED ORAL EVERY 6 HOURS PRN
Qty: 60 TABLET | Refills: 3 | Status: SHIPPED | OUTPATIENT
Start: 2021-03-10 | End: 2021-06-28

## 2021-03-11 DIAGNOSIS — M79.7 FIBROMYALGIA: ICD-10-CM

## 2021-03-11 DIAGNOSIS — N39.41 URGENCY INCONTINENCE: ICD-10-CM

## 2021-03-15 DIAGNOSIS — N39.41 URGENCY INCONTINENCE: ICD-10-CM

## 2021-03-15 DIAGNOSIS — M79.7 FIBROMYALGIA: ICD-10-CM

## 2021-03-15 DIAGNOSIS — F51.01 PRIMARY INSOMNIA: ICD-10-CM

## 2021-03-15 NOTE — TELEPHONE ENCOUNTER
Pt is calling.    Scripts are due around the same time. She calls all of them in at once. She did put these in early. All other scripts were filled except for 2.  She hasn't gotten 2 of them.   She is completely out of both of them. Needs refill today.  She stated that Dr Casillas is her PCP. Not Odilon Fuentes CNP.     Suzy Hough RN  [facility] Nurse Advisor  3/15/2021 at 11:18 AM

## 2021-03-15 NOTE — TELEPHONE ENCOUNTER
Robaxin  Last Written Prescription Date:  2/12/21  Last Fill Quantity: 90,  # refills: 0   Last office visit: 1/26/2021 with prescribing provider:  Marilyn Casillas for Physical exam.    Future Office Visit:  NONE  Routing refill request to provider for review/approval because:  Drug not on the [facility] refill protocol   MAC Rust

## 2021-03-16 RX ORDER — OXYBUTYNIN CHLORIDE 5 MG/1
TABLET ORAL
Qty: 360 TABLET | Refills: 2 | Status: SHIPPED | OUTPATIENT
Start: 2021-03-16

## 2021-03-16 RX ORDER — TEMAZEPAM 30 MG
CAPSULE ORAL
Qty: 30 CAPSULE | Refills: 0 | Status: SHIPPED | OUTPATIENT
Start: 2021-03-16 | End: 2021-05-10

## 2021-03-16 RX ORDER — METHOCARBAMOL 750 MG/1
TABLET, FILM COATED ORAL
Qty: 90 TABLET | Refills: 0 | OUTPATIENT
Start: 2021-03-16

## 2021-03-16 RX ORDER — METHOCARBAMOL 750 MG/1
TABLET, FILM COATED ORAL
Qty: 90 TABLET | Refills: 0 | Status: SHIPPED | OUTPATIENT
Start: 2021-03-16 | End: 2021-05-03

## 2021-03-17 NOTE — TELEPHONE ENCOUNTER
Robaxin signed earlier today by Odilon SANCHEZ, declined by me. Ditropan approved.   Myrna Casillas MD

## 2021-03-22 NOTE — RESULT ENCOUNTER NOTE
Hoda, sorry for the delay in getting this back to you. Your bone density test is abnormal. You have mild osteoporosis (thinning of the bones) in your spine and osteopenia (mild thinning but not bad enough to call osteoporosis) in both hips.   This puts you at risk for broken bones. Smoking is a big risk factor for thinning and for broken bones.   I recommend medication to reduce your risk. Please schedule a follow up appointment (can be virtual) if you'd like to discuss your options.   Myrna Casillas MD

## 2021-03-24 NOTE — PROGRESS NOTES
Outpatient Physical Therapy Discharge Note     Patient: Nicole Franco  : 1960    Beginning/End Dates of Reporting Period:  2/3/2021 to 3/24/2021    Referring Provider: elpidio molina MD     Therapy Diagnosis: neck , back , hip pain      Client Self Report: had injection in her back last week and is overall moving much better ,     Objective Measurements:  Objective Measure: back pain 2/10 left groin 6/10          patient seen for 2 Rx sessions one on land and one in pool at last Rx HEP pool therapy , HEP standing heelcord and sitting hamstring stretch hold 10 x 5 , standing hip flexion , extension , abduction 10x goal 30     Patient no showed her last Rx 3/2/21 and cancelled 2 Rx               Goals:  Goal Identifier 1   Goal Description instruction in HEP and compliant with it 5 of 7 days to improve quality of life    Target Date 21   Date Met      Progress:     Goal Identifier 2   Goal Description patient to have improved quality and safety of gait currently tug 34 seconds goal is 20 and no falls    Target Date 21   Date Met      Progress:     Goal Identifier 3   Goal Description patient to have reduction in pain level currently 3-9/10 goal is 50% less 10    Target Date 21   Date Met      Progress:         Progress Toward Goals:   Progress this reporting period: patient seen for 2 Rx and cancelled / no showed 3 Rx and has not followed up with PT     Plan:  Discharge from therapy.    Discharge:    Reason for Discharge: No further expectation of progress.  Patient chooses to discontinue therapy.  Patient has failed to schedule further appointments.    Equipment Issued: none    Discharge Plan: Patient to continue home program.

## 2021-03-29 DIAGNOSIS — E87.6 HYPOKALEMIA: Primary | ICD-10-CM

## 2021-03-29 DIAGNOSIS — I10 ESSENTIAL HYPERTENSION: ICD-10-CM

## 2021-03-31 RX ORDER — LISINOPRIL AND HYDROCHLOROTHIAZIDE 20; 25 MG/1; MG/1
TABLET ORAL
Qty: 90 TABLET | Refills: 1 | Status: SHIPPED | OUTPATIENT
Start: 2021-03-31

## 2021-03-31 RX ORDER — POTASSIUM CHLORIDE 750 MG/1
10 TABLET, EXTENDED RELEASE ORAL DAILY
Qty: 30 TABLET | Refills: 1 | Status: SHIPPED | OUTPATIENT
Start: 2021-03-31 | End: 2021-06-02

## 2021-03-31 NOTE — TELEPHONE ENCOUNTER
I am approving her refill but I do need to address her blood pressure as it was not well controlled at her physical.  Also her potassium is low so I am starting her on a potassium supplement and will want to see her back to follow-up on both.  See my chart message to patient.  Myrna Casillas MD

## 2021-03-31 NOTE — TELEPHONE ENCOUNTER
" Forwarding refill to Dr. Casillas as she was the provider that saw pt last, 1/26/21,  and ordered the labs. ...........MAC Rust    Lisinopril-hydrochlorothiazide  Last Written Prescription Date:  12/22/20  Last Fill Quantity: 90,  # refills: 0   Last office visit: 1/26/2021 with prescribing provider:  Dr. Casillas   Future Office Visit:  NONE  Requested Prescriptions   Pending Prescriptions Disp Refills     lisinopril-hydrochlorothiazide (ZESTORETIC) 20-25 MG tablet [Pharmacy Med Name: Lisinopril-hydroCHLOROthiazide 20-25 MG Oral Tablet] 90 tablet 0     Sig: Take 1 tablet by mouth once daily       Diuretics (Including Combos) Protocol Failed - 3/29/2021  3:43 PM        Failed - Blood pressure under 140/90 in past 12 months     BP Readings from Last 3 Encounters:   01/26/21 (!) 140/90   10/02/20 118/80   07/06/20 110/76           Failed - Normal serum potassium on file in past 12 months     Recent Labs   Lab Test 01/26/21  1525   POTASSIUM 3.1*            Passed - Recent (12 mo) or future (30 days) visit within the authorizing provider's specialty     Patient has had an office visit with the authorizing provider or a provider within the authorizing providers department within the previous 12 mos or has a future within next 30 days. See \"Patient Info\" tab in inbasket, or \"Choose Columns\" in Meds & Orders section of the refill encounter.            Passed - Medication is active on med list        Passed - Patient is age 18 or older        Passed - No active pregancy on record        Passed - Normal serum creatinine on file in past 12 months     Recent Labs   Lab Test 01/26/21  1525   CR 0.96            Passed - Normal serum sodium on file in past 12 months     Recent Labs   Lab Test 01/26/21  1525                 Passed - No positive pregnancy test in past 12 months       ACE Inhibitors (Including Combos) Protocol Failed - 3/29/2021  3:43 PM        Failed - Blood pressure under 140/90 in past 12 months " "    BP Readings from Last 3 Encounters:   01/26/21 (!) 140/90   10/02/20 118/80   07/06/20 110/76           Failed - Normal serum potassium on file in past 12 months     Recent Labs   Lab Test 01/26/21  1525   POTASSIUM 3.1*           Passed - Recent (12 mo) or future (30 days) visit within the authorizing provider's specialty     Patient has had an office visit with the authorizing provider or a provider within the authorizing providers department within the previous 12 mos or has a future within next 30 days. See \"Patient Info\" tab in inbasket, or \"Choose Columns\" in Meds & Orders section of the refill encounter.            Passed - Medication is active on med list        Passed - Patient is age 18 or older        Passed - No active pregnancy on record        Passed - Normal serum creatinine on file in past 12 months     Recent Labs   Lab Test 01/26/21  1525   CR 0.96       Ok to refill medication if creatinine is low          Passed - No positive pregnancy test within past 12 months         Routing refill request to provider for review/approval because:  Labs out of range:  K+ above.   MAC Rust                    "

## 2021-04-01 ENCOUNTER — MYC MEDICAL ADVICE (OUTPATIENT)
Dept: FAMILY MEDICINE | Facility: CLINIC | Age: 61
End: 2021-04-01

## 2021-04-02 NOTE — TELEPHONE ENCOUNTER
RN TRIAGE CALL:    Patient Contact    Attempt # 1    Was call answered?  No.  Left message on voicemail with information regarding My chart message sent and scheduled visit for her concerns.    My Chart message sent with information as per stated above.    Kacey Odonnell RN

## 2021-04-02 NOTE — TELEPHONE ENCOUNTER
See MyChart.  Patient would like an appointment to go over BP and test results.  She informed to keep track of BP and medication review.    Will leave open in case she has additional questions  MAILE Acosta, RN

## 2021-04-12 DIAGNOSIS — F41.8 DEPRESSION WITH ANXIETY: ICD-10-CM

## 2021-04-12 DIAGNOSIS — G89.29 OTHER CHRONIC PAIN: ICD-10-CM

## 2021-04-14 RX ORDER — GABAPENTIN 300 MG/1
CAPSULE ORAL
Qty: 720 CAPSULE | Refills: 0 | Status: SHIPPED | OUTPATIENT
Start: 2021-04-14 | End: 2021-08-18

## 2021-04-14 RX ORDER — SERTRALINE HYDROCHLORIDE 100 MG/1
TABLET, FILM COATED ORAL
Qty: 135 TABLET | Refills: 0 | Status: SHIPPED | OUTPATIENT
Start: 2021-04-14 | End: 2021-06-03

## 2021-04-14 RX ORDER — BUSPIRONE HYDROCHLORIDE 10 MG/1
TABLET ORAL
Qty: 270 TABLET | Refills: 0 | Status: SHIPPED | OUTPATIENT
Start: 2021-04-14 | End: 2021-08-31

## 2021-04-14 NOTE — TELEPHONE ENCOUNTER
Gabapentin      Last Written Prescription Date:  12/31/2020  Last Fill Quantity: 720,   # refills: 0  Last Office Visit: 1/26/2021  Future Office visit:       Routing refill request to provider for review/approval because:  Drug not on the [facility], [facility] or [facility] refill protocol or controlled substance    Routing refill request to provider for review/approval because:  PHQ-9 >4 (score of 10)    GET PinedaN, RN  [facility]

## 2021-04-15 DIAGNOSIS — F41.8 DEPRESSION WITH ANXIETY: ICD-10-CM

## 2021-04-19 ENCOUNTER — VIRTUAL VISIT (OUTPATIENT)
Dept: FAMILY MEDICINE | Facility: CLINIC | Age: 61
End: 2021-04-19
Payer: COMMERCIAL

## 2021-04-19 DIAGNOSIS — M79.7 FIBROMYALGIA: ICD-10-CM

## 2021-04-19 DIAGNOSIS — G89.4 CHRONIC PAIN DISORDER: ICD-10-CM

## 2021-04-19 DIAGNOSIS — Z71.6 ENCOUNTER FOR TOBACCO USE CESSATION COUNSELING: ICD-10-CM

## 2021-04-19 DIAGNOSIS — I10 ESSENTIAL HYPERTENSION: ICD-10-CM

## 2021-04-19 DIAGNOSIS — M81.0 AGE-RELATED OSTEOPOROSIS WITHOUT CURRENT PATHOLOGICAL FRACTURE: Primary | ICD-10-CM

## 2021-04-19 DIAGNOSIS — M47.26 OSTEOARTHRITIS OF SPINE WITH RADICULOPATHY, LUMBAR REGION: ICD-10-CM

## 2021-04-19 DIAGNOSIS — E87.6 HYPOKALEMIA: ICD-10-CM

## 2021-04-19 PROCEDURE — 99214 OFFICE O/P EST MOD 30 MIN: CPT | Mod: 95 | Performed by: FAMILY MEDICINE

## 2021-04-19 RX ORDER — SERTRALINE HYDROCHLORIDE 100 MG/1
TABLET, FILM COATED ORAL
Qty: 45 TABLET | Refills: 0 | OUTPATIENT
Start: 2021-04-19

## 2021-04-19 RX ORDER — VARENICLINE TARTRATE 1 MG/1
1 TABLET, FILM COATED ORAL 2 TIMES DAILY
Qty: 60 TABLET | Refills: 1 | Status: SHIPPED | OUTPATIENT
Start: 2021-04-19

## 2021-04-19 RX ORDER — ALENDRONATE SODIUM 70 MG/1
70 TABLET ORAL
Qty: 13 TABLET | Refills: 3 | Status: SHIPPED | OUTPATIENT
Start: 2021-04-19

## 2021-04-19 NOTE — PROGRESS NOTES
TONYA is a 60 year old who is being evaluated via a billable telephone visit.      Telephone visit performed in lieu of an in-person visit due to current concerns regarding social distancing and keeping people safe.  Physician and provider agreed this was appropriate for concerns addressed.     What phone number would you like to be contacted at? [phone]  How would you like to obtain your AVS? MyChart    Assessment & Plan       ICD-10-CM    1. Age-related osteoporosis without current pathological fracture  M81.0 alendronate (FOSAMAX) 70 MG tablet   2. Encounter for tobacco use cessation counseling  Z71.6 varenicline (CHANTIX STARTING MONTH MIRLANDE) 0.5 MG X 11 & 1 MG X 42 tablet     varenicline (CHANTIX CONTINUING MONTH MIRLANDE) 1 MG tablet   3. Hypokalemia  E87.6 Basic metabolic panel  (Ca, Cl, CO2, Creat, Gluc, K, Na, BUN)   4. Osteoarthritis of spine with radiculopathy, lumbar region  M47.26 PAIN MANAGEMENT REFERRAL   5. Fibromyalgia  M79.7    6. Chronic pain disorder  G89.4    7. Essential hypertension  I10       We talked about starting on Fosamax and alternate medications including monthly medications, injectable medications and yearly medications.  We talked about Evista.  She does have a history of breast cancer and I opted to go with Fosamax.  She does have a history of GERD but it is well controlled on her omeprazole. We discussed continuing on calcium and vitamin D supplements, and weight bearing exercise.     She has not yet bought her blood pressure cuff and started monitoring her blood pressures but she is going to do that.  I recently started her on potassium in addition to her Zestoretic and she will update me with her readings.  She will also make a lab appointment to get her basic panel rechecked.    I did agree to start her on Chantix and we discussed proper use.  We talked about quitting after 1 week on the medication and hopefully discontinuing the medication after 3 months if possible.  We did  "talk about possible side effects including GI side effects.    I did agree to refer her to Dr. Yen for consultation for possible repeat epidural steroid injection.  She may need to wait a bit longer since it has only been 2 months since her last injection.  I told her I would not just order an injection but because she has had them before, if Dr. Yen can review her records and feel this is appropriate then I will approve.    Review of prior external note(s) from - [facility]  45 minutes spent on the date of the encounter doing chart review, history and exam, documentation and further activities per the note         No follow-ups on file.    Myrna Casillas MD  [facility]    Vito CASTANEDA is a 60 year old who presents for the following health issues     HPI     Follow up of DEXA results    Taking Medication as prescribed: yes    Side Effects:  None    Medication Helping Symptoms:  yes     She had a recent bone density scan that showed osteoporosis in the spine and osteopenia in the hips.   IMPRESSION:  1. Mild osteoporosis in the lumbar spine.  2. Moderate osteopenia in both proximal femurs.     She is a smoker and knows she needs to quit. She feels ready. Is currently smoking 7-8 cigs/day, would like to consider Chantix.  She is a little bit nervous about medication interactions but really wants to quit.    Patient would also like a referral for her back.  She has a history of chronic back pain and has been seen in the past at [facility]. She has had some epidural injections in both the lumbar and cervical spine in the past. Dr. Caraballo has been able to \"hit the right spot\" in the lumbar area, with good relief. She did not get as good relief with her cervical spine injections nor with her hip injection.     She has fibromyalgia and has been really hurting all over.  She uses Lanacane topically often.  She doesn't do well in cold weather " and is thinking she needs to move to a warmer climate. She was planning to move to [place] but now that's not going to work. Her son lives in [place] and he has offered for her to come there, she is considering. In the meantime, she needs to find a provider close by that can give her another injection in her lumbar spine.     Also she had labs done in January and her potassium was low at 3.1.  I started her on a potassium supplement which she is taking now.  She is due for recheck.  She also had elevated blood pressures and I wanted her to get a cuff and start monitoring herself at home and update me with readings.  She has not yet done that.    She does not sleep well despite her chronic use of sleep medications.  She has alternated between Ambien and temazepam for many years.    She does take omeprazole for chronic GERD.  She tried going off of it but got heartburn.  It works well.    Review of Systems   Constitutional, HEENT, cardiovascular, pulmonary, gi and gu systems are negative, except as otherwise noted.      Objective           Vitals:  No vitals were obtained today due to virtual visit.    Physical Exam   healthy, alert and no distress  PSYCH: Alert and oriented times 3; coherent speech, normal   rate and volume, able to articulate logical thoughts, able   to abstract reason, no tangential thoughts, no hallucinations   or delusions  Her affect is normal and pleasant.   RESP: No cough, no audible wheezing, able to talk in full sentences  Remainder of exam unable to be completed due to telephone visits    Office Visit on 01/26/2021   Component Date Value Ref Range Status     Sodium 01/26/2021 137  133 - 144 mmol/L Final     Potassium 01/26/2021 3.1* 3.4 - 5.3 mmol/L Final     Chloride 01/26/2021 100  94 - 109 mmol/L Final     Carbon Dioxide 01/26/2021 31  20 - 32 mmol/L Final     Anion Gap 01/26/2021 6  3 - 14 mmol/L Final     Glucose 01/26/2021 76  70 - 99 mg/dL Final    Fasting specimen     Urea Nitrogen  01/26/2021 19  7 - 30 mg/dL Final     Creatinine 01/26/2021 0.96  0.52 - 1.04 mg/dL Final     GFR Estimate 01/26/2021 64  >60 mL/min/[1.73_m2] Final    Comment: Non  GFR Calc  Starting 12/18/2018, serum creatinine based estimated GFR (eGFR) will be   calculated using the Chronic Kidney Disease Epidemiology Collaboration   (CKD-EPI) equation.       GFR Estimate If Black 01/26/2021 74  >60 mL/min/[1.73_m2] Final    Comment:  GFR Calc  Starting 12/18/2018, serum creatinine based estimated GFR (eGFR) will be   calculated using the Chronic Kidney Disease Epidemiology Collaboration   (CKD-EPI) equation.       Calcium 01/26/2021 9.8  8.5 - 10.1 mg/dL Final     Bilirubin Total 01/26/2021 0.5  0.2 - 1.3 mg/dL Final     Albumin 01/26/2021 4.2  3.4 - 5.0 g/dL Final     Protein Total 01/26/2021 7.9  6.8 - 8.8 g/dL Final     Alkaline Phosphatase 01/26/2021 112  40 - 150 U/L Final     ALT 01/26/2021 22  0 - 50 U/L Final     AST 01/26/2021 28  0 - 45 U/L Final     Cholesterol 01/26/2021 187  <200 mg/dL Final     Triglycerides 01/26/2021 114  <150 mg/dL Final    Fasting specimen     HDL Cholesterol 01/26/2021 43* >49 mg/dL Final     LDL Cholesterol Calculated 01/26/2021 121* <100 mg/dL Final    Comment: Above desirable:  100-129 mg/dl  Borderline High:  130-159 mg/dL  High:             160-189 mg/dL  Very high:       >189 mg/dl       Non HDL Cholesterol 01/26/2021 144* <130 mg/dL Final    Comment: Above Desirable:  130-159 mg/dl  Borderline high:  160-189 mg/dl  High:             190-219 mg/dl  Very high:       >219 mg/dl               Phone call duration: 28 minutes

## 2021-05-03 ENCOUNTER — VIRTUAL VISIT (OUTPATIENT)
Dept: PALLIATIVE MEDICINE | Facility: CLINIC | Age: 61
End: 2021-05-03
Attending: FAMILY MEDICINE
Payer: COMMERCIAL

## 2021-05-03 DIAGNOSIS — M79.7 FIBROMYALGIA: ICD-10-CM

## 2021-05-03 DIAGNOSIS — M54.16 LUMBAR RADICULOPATHY: Primary | ICD-10-CM

## 2021-05-03 DIAGNOSIS — M47.26 OSTEOARTHRITIS OF SPINE WITH RADICULOPATHY, LUMBAR REGION: ICD-10-CM

## 2021-05-03 PROCEDURE — 99213 OFFICE O/P EST LOW 20 MIN: CPT | Mod: 95 | Performed by: PAIN MEDICINE

## 2021-05-03 RX ORDER — METHOCARBAMOL 750 MG/1
TABLET, FILM COATED ORAL
Qty: 90 TABLET | Refills: 0 | Status: SHIPPED | OUTPATIENT
Start: 2021-05-03 | End: 2021-06-01

## 2021-05-03 NOTE — TELEPHONE ENCOUNTER
Requested Prescriptions   Pending Prescriptions Disp Refills     methocarbamol (ROBAXIN) 750 MG tablet [Pharmacy Med Name: Methocarbamol 750 MG Oral Tablet] 90 tablet 0     Sig: TAKE 1 TABLET BY MOUTH THREE TIMES DAILY     Last Written Prescription Date:  03/16/2021  Last Fill Quantity: 90,   # refills: 0  Last Office Visit: 04/19/2021  Future Office visit:       Routing refill request to provider for review/approval because:  Drug not on the [facility], [facility] or [facility] refill protocol or controlled substance  Routing to covering provider to address. Odilon out of office until 5/7/2021    Keya Gutierrez MA

## 2021-05-03 NOTE — PROGRESS NOTES
Nicole Franco is a 60 year old female who is being evaluated via a billable telephone visit.          [facility] Consultation    Date of visit: 5/3/2021    Reason for consultation:    Primary Care Provider is Odilon Fuentes.  Pain medications are being prescribed by n/a.    Please see the [facility] health questionnaire which the patient completed and reviewed with me in detail.    Chief Complaint:    No chief complaint on file.    MME prescribed prior to seeing patient:  Current MME:    Pain history:  Left l4-5, l5-S1 TRANSFORAMINAL EPIDURAL STEROID INJECTION    Nicole Franco is a 60 year old female who first started having problems with pain chronic  The pt worse pain is left sided lbp radiating to her LE   The pain radiates to there dorsal surface of her foot  The pain is becoming more constant  The pain is throbbing in nature  The pt reports tingling on the top of the foot  She denies numbness/tingling  The pain is worse with bending  Pain is worse with lifting  Pain worse with prolonged activity   The pain is slightly better with rest   Uses walker  Denies overt progressive weakness  Denies any recent falls  Denies incontinence  Overall the pain sig affects her quality of life  The pain affects her sleep      Bilateral groin hip pain    Pain rating: intensity  Averages 5/10 on a 0-10 scale.      Current treatments include:  robaxin    Previous medication treatments included:  Elavil-could not function  bclofen  Other treatments have included:  Nicole Franco has been seen at a pain clinic in the past.    Injections:  Left tfesi l4-5, l5-S1 sig benefit for approx 3 months  No relief hip pain  Minimal relief inder   Past Medical History:  Past Medical History:   Diagnosis Date     Anxiety      Arthritis      Breast CA (H) 2006    right breast cancer     Depressive disorder      Hypertension      Skin cancer      Past Surgical History:  Past Surgical  History:   Procedure Laterality Date     APPENDECTOMY       COLONOSCOPY N/A 02/18/2020    normal, repeat 5 years     MASTECTOMY RADICAL Right 2006     Medications:  Current Outpatient Medications   Medication Sig Dispense Refill     alendronate (FOSAMAX) 70 MG tablet Take 1 tablet (70 mg) by mouth every 7 days (Patient not taking: Reported on 5/17/2021) 13 tablet 3     ascorbic acid (VITAMIN C) 500 MG tablet Take 500 mg by mouth daily.       atorvastatin (LIPITOR) 10 MG tablet Take 10 mg by mouth       busPIRone (BUSPAR) 10 MG tablet TAKE 1 TABLET BY MOUTH THREE TIMES DAILY 270 tablet 0     Calcium Carbonate 650 MG TABS Take  by mouth.       cholecalciferol (VITAMIN D) 1000 UNIT tablet Take 1 tablet by mouth daily.       Cranberry 400 MG TABS        Cyanocobalamin (VITAMIN B-12 CR PO)        fish oil-omega-3 fatty acids (FISH OIL) 1000 MG capsule Take 1 g by mouth daily.       Flaxseed, Linseed, 1200 MG CAPS Take  by mouth.       gabapentin (NEURONTIN) 300 MG capsule TAKE 4 CAPSULES BY MOUTH TWICE DAILY 720 capsule 0     gemfibrozil (LOPID) 600 MG tablet TAKE 1 TABLET BY MOUTH TWICE DAILY BEFORE MEAL(S) 180 tablet 1     hydrOXYzine (ATARAX) 25 MG tablet Take 2 tablets (50 mg) by mouth every 6 hours as needed for itching 60 tablet 3     lisinopril-hydrochlorothiazide (ZESTORETIC) 20-25 MG tablet Take 1 tablet by mouth once daily 90 tablet 1     Multiple Vitamin (MULTI-VITAMIN) per tablet Take 1 tablet by mouth daily.       order for DME Equipment being ordered: 4 wheeled walker w/seat & brakes - please fax to [facility] [phone] 1 Device 0     oxybutynin (DITROPAN) 5 MG tablet Take 2 tablets by mouth twice daily 360 tablet 2     potassium chloride ER (K-TAB/KLOR-CON) 10 MEQ CR tablet Take 1 tablet (10 mEq) by mouth daily 30 tablet 1     Sennosides ([facility] LAX-X PO)        sertraline (ZOLOFT) 100 MG tablet TAKE 1 & 1/2 (ONE & ONE-HALF) TABLETS BY MOUTH ONCE DAILY 135 tablet 0     varenicline  (CHANTIX CONTINUING MONTH PAK) 1 MG tablet Take 1 tablet (1 mg) by mouth 2 times daily (Patient not taking: Reported on 5/17/2021) 60 tablet 1     vitamin E (TOCOPHEROL) 100 UNIT capsule Take 100 Units by mouth daily.       HYDROcodone-acetaminophen (NORCO)  MG per tablet Take 1 tablet by mouth At Bedtime for 14 days Until back injection 14 tablet 0     methocarbamol (ROBAXIN) 750 MG tablet TAKE 1 TABLET BY MOUTH THREE TIMES DAILY 90 tablet 0     omeprazole (PRILOSEC) 20 MG DR capsule Take 1 capsule by mouth once daily 30 capsule 3     temazepam (RESTORIL) 30 MG capsule TAKE 1 CAPSULE BY MOUTH NIGHTLY AS NEEDED FOR SLEEP 30 capsule 0     varenicline (CHANTIX STARTING MONTH MIRLANDE) 0.5 MG X 11 & 1 MG X 42 tablet Use as directed (Patient not taking: Reported on 5/3/2021) 53 tablet 0     Allergies:     Allergies   Allergen Reactions     Amoxicillin      Aspirin      Mild rash     Cephalexin Hives     Flagyl [Metronidazole]      severe hives     Sulfa Drugs Hives     edema     Social History:    History of chemical dependency treatment: None    Family history:  Family History   Problem Relation Age of Onset     Hypertension Mother      Pneumonia Father         covid     Skin Cancer Father      Clotting Disorder Father         IVC filter     Family history of headaches: n    Review of Systems:  Skin: negative  Eyes: negative  Ears/Nose/Throat: negative  Respiratory: No shortness of breath, dyspnea on exertion, cough, or hemoptysis  Cardiovascular: negative  Gastrointestinal: negative  Genitourinary: negative  Musculoskeletal: negative  Neurologic: negative  Psychiatric: negative  Hematologic/Lymphatic/Immunologic: negative  Endocrine: negative    Physical Exam:  There were no vitals filed for this visit.  Exam:  No acute distress speaking full sentences and not able to perform remainder of exam secondary to being telephone encounter   \  Diagnostic tests:  MRI CERVICAL SPINE WITHOUT CONTRAST 8/14/2020 2:21 PM       HISTORY: Spondylosis without myelopathy or radiculopathy, cervical  region.      TECHNIQUE: Multiplanar, multisequence MRI of the cervical spine  without contrast.      COMPARISON: Cervical spine CT dated 3/27/2016.     FINDINGS: There is new mild anterolisthesis of C4 on C5 measuring 2  mm. There is straightening of the cervical lordosis along the lower  aspect of the cervical spine. Alignment is otherwise normal in the  sagittal plane. No fracture. Modic type I degenerative endplate change  at C5-C6 and to a lesser degree at C6-C7. Mild patchy T2/STIR  hyperintense signal in the brainstem is presumably related to chronic  small vessel ischemic changes, although incompletely evaluated. No  cord signal abnormality. The paraspinous soft tissues are  unremarkable.     Segmental analysis:  Craniocervical junction/C1-C2: Mild degenerative changes at the median  atlantoaxial joint. Otherwise unremarkable.     C2-C3: Normal disc height. No significant disc bulge or herniation.  Normal facets. No spinal canal or neural foraminal stenosis.     C3-C4: Mild disc height loss. Small symmetric disc bulge. Moderate  left facet arthropathy, with associated small facet joint effusion.  The right facet joint appears normal. No spinal stenosis. Mild  bilateral neural foraminal stenosis.     C4-C5: Mild disc height loss. Small symmetric disc bulge. Mild right  and moderate to severe left facet arthropathy. No spinal stenosis.  Mild bilateral neural foraminal stenosis.     C5-C6: Moderate disc height loss. There is a diffuse disc bulge  eccentric to the right with right more than left uncovertebral  arthropathy and mild-to-moderate bilateral facet arthropathy.  Ligamentum flavum thickening. Mild spinal stenosis. Severe right and  mild-to-moderate left neural foraminal stenosis.     C6-C7: Moderate-to-severe disc height loss. Diffuse disc bulge with  bilateral uncovertebral arthropathy and mild bilateral facet  arthropathy. No  spinal stenosis. Mild mass effect on the ventral  thecal sac by the disc osteophyte complex. Moderate left and mild  right neural foraminal stenosis.     C7-T1: Normal disc height. No significant disc bulge or herniation.  Normal facets. No spinal canal or neural foraminal stenosis.                                                                      IMPRESSION:  Multilevel degenerative disc disease and uncovertebral and facet  arthropathy of the cervical spine, as described. The most significant  findings are seen at C5-C6 and C6-C7. Please see the body of report  for details.             Assessment/Plan:  Nicole Franco is a 60 year old female who presents with the complaints of left-sided low back pain radiating to her lower extremity.   Diagnoses and all orders for this visit:    Lumbar radiculopathy  -     PAIN INJECTION EVAL/TREAT/FOLLOW UP    Osteoarthritis of spine with radiculopathy, lumbar region  -     PAIN MANAGEMENT REFERRAL         - Further procedures recommended:   Ordered left tfesi l4-5, l5-S1  - Medication Management: No changes at this time    - Follow up: For procedure      The total TIME spent on this patient on the day of the appointment was 24 minutes.   Time spent preparing to see the patient (reviewing records and tests)  Time spent ordering tests, medications, procedures and referrals  Time spent Referring and communicating with other healthcare professionals  Documenting clinical information in Epic    Hadley Saez MD  [facility]  This note was created with voice recognition software, and while reviewed for accuracy, typos may remain.

## 2021-05-05 ENCOUNTER — TELEPHONE (OUTPATIENT)
Dept: PALLIATIVE MEDICINE | Facility: CLINIC | Age: 61
End: 2021-05-05

## 2021-05-05 NOTE — TELEPHONE ENCOUNTER
Pt scheduled for [facility]   Date: 5/21/21  Time: 200pm  Dr. Yen    Instructed pt to have H&P and  for procedure.  Patient informed of COVID testing process.

## 2021-05-09 DIAGNOSIS — Z11.59 ENCOUNTER FOR SCREENING FOR OTHER VIRAL DISEASES: ICD-10-CM

## 2021-05-10 DIAGNOSIS — K21.9 GASTROESOPHAGEAL REFLUX DISEASE WITHOUT ESOPHAGITIS: ICD-10-CM

## 2021-05-10 DIAGNOSIS — F51.01 PRIMARY INSOMNIA: ICD-10-CM

## 2021-05-10 RX ORDER — TEMAZEPAM 30 MG
CAPSULE ORAL
Qty: 30 CAPSULE | Refills: 0 | Status: SHIPPED | OUTPATIENT
Start: 2021-05-10 | End: 2021-06-03

## 2021-05-10 NOTE — TELEPHONE ENCOUNTER
Temazepam        Last Written Prescription Date:  3/16/2021  Last Fill Quantity: 30,   # refills: 0  Last Office Visit: 9/15/2020  Future Office visit:       Routing refill request to provider for review/approval because:  Drug not on the [facility], P or [facility] refill protocol or controlled substance

## 2021-05-11 NOTE — TELEPHONE ENCOUNTER
Routing refill request to provider for review/approval because:  Osteoporosis DX, needs provider approval    GET PinedaN, RN  [facility]

## 2021-05-13 ENCOUNTER — TELEPHONE (OUTPATIENT)
Dept: FAMILY MEDICINE | Facility: CLINIC | Age: 61
End: 2021-05-13

## 2021-05-13 NOTE — TELEPHONE ENCOUNTER
Patient calling and stating she is scheduled for a procedure with Dr. Yen for an injection to her back on Friday May 21st. Patient needs labs and a pre op. Patient is scheduled with Odilon Fuentes on Monday at 2:00 pm for the pre op. Lab for BMP on 5/17 at 1:30 pm, and pre procedure covid testing on Tuesday, May 18th at 1:50 pm. Francesca Ortez LPN

## 2021-05-17 ENCOUNTER — OFFICE VISIT (OUTPATIENT)
Dept: FAMILY MEDICINE | Facility: CLINIC | Age: 61
End: 2021-05-17
Payer: COMMERCIAL

## 2021-05-17 VITALS
WEIGHT: 160.25 LBS | BODY MASS INDEX: 26.67 KG/M2 | SYSTOLIC BLOOD PRESSURE: 126 MMHG | OXYGEN SATURATION: 96 % | RESPIRATION RATE: 20 BRPM | DIASTOLIC BLOOD PRESSURE: 84 MMHG | TEMPERATURE: 98.6 F | HEART RATE: 85 BPM

## 2021-05-17 DIAGNOSIS — Z01.818 PREOP GENERAL PHYSICAL EXAM: Primary | ICD-10-CM

## 2021-05-17 DIAGNOSIS — E87.6 HYPOKALEMIA: ICD-10-CM

## 2021-05-17 DIAGNOSIS — Z11.59 ENCOUNTER FOR SCREENING FOR OTHER VIRAL DISEASES: ICD-10-CM

## 2021-05-17 DIAGNOSIS — M47.26 OSTEOARTHRITIS OF SPINE WITH RADICULOPATHY, LUMBAR REGION: ICD-10-CM

## 2021-05-17 LAB
ANION GAP SERPL CALCULATED.3IONS-SCNC: 8 MMOL/L (ref 3–14)
BUN SERPL-MCNC: 15 MG/DL (ref 7–30)
CALCIUM SERPL-MCNC: 9.7 MG/DL (ref 8.5–10.1)
CHLORIDE SERPL-SCNC: 104 MMOL/L (ref 94–109)
CO2 SERPL-SCNC: 25 MMOL/L (ref 20–32)
CREAT SERPL-MCNC: 0.94 MG/DL (ref 0.52–1.04)
GFR SERPL CREATININE-BSD FRML MDRD: 65 ML/MIN/{1.73_M2}
GLUCOSE SERPL-MCNC: 98 MG/DL (ref 70–99)
POTASSIUM SERPL-SCNC: 3.4 MMOL/L (ref 3.4–5.3)
SARS-COV-2 RNA RESP QL NAA+PROBE: NORMAL
SODIUM SERPL-SCNC: 137 MMOL/L (ref 133–144)
SPECIMEN SOURCE: NORMAL

## 2021-05-17 PROCEDURE — 36415 COLL VENOUS BLD VENIPUNCTURE: CPT | Performed by: FAMILY MEDICINE

## 2021-05-17 PROCEDURE — 80048 BASIC METABOLIC PNL TOTAL CA: CPT | Performed by: FAMILY MEDICINE

## 2021-05-17 PROCEDURE — 99213 OFFICE O/P EST LOW 20 MIN: CPT | Performed by: NURSE PRACTITIONER

## 2021-05-17 PROCEDURE — U0003 INFECTIOUS AGENT DETECTION BY NUCLEIC ACID (DNA OR RNA); SEVERE ACUTE RESPIRATORY SYNDROME CORONAVIRUS 2 (SARS-COV-2) (CORONAVIRUS DISEASE [COVID-19]), AMPLIFIED PROBE TECHNIQUE, MAKING USE OF HIGH THROUGHPUT TECHNOLOGIES AS DESCRIBED BY CMS-2020-01-R: HCPCS | Performed by: ANESTHESIOLOGY

## 2021-05-17 PROCEDURE — U0005 INFEC AGEN DETEC AMPLI PROBE: HCPCS | Performed by: ANESTHESIOLOGY

## 2021-05-17 ASSESSMENT — PAIN SCALES - GENERAL: PAINLEVEL: EXTREME PAIN (8)

## 2021-05-17 NOTE — PROGRESS NOTES
[address] [address]  Phone: [phone]  Fax: [phone]  Primary Provider: Devin Moscoso  Pre-op Performing Provider: DEVIN MOSCOSO      PREOPERATIVE EVALUATION:  Today's date: 5/17/2021    Nicole Franco is a 60 year old female who presents for a preoperative evaluation.    Surgical Information:  Surgery/Procedure: Epidural steroid injection  Surgery Location: [facility]  Surgeon: Dr. Yen  Surgery Date: 05/21/2021  Time of Surgery: 1:00pm  Where patient plans to recover: At home with family  Fax number for surgical facility: Note does not need to be faxed, will be available electronically in Epic.    Type of Anesthesia Anticipated: Epidural    Assessment & Plan     The proposed surgical procedure is considered LOW risk.    Preop general physical exam  He has had some upset stomach following the fosamax. She had nausea following the medication. No other fevers chills or body aches.   - Chronic conditions are other wise stable.   - She was taking her potassium on an empty stomach and found to have some undigested tablets in her stool. Discussed with pharmacy she will take with food moving forward.   - Potassium in normal range today.     Osteoarthritis of spine with radiculopathy, lumbar region  - Chronic pain from this. The epidural injections have worked well for her in the past.       Possible Sleep Apnea: No      Implanted Device: Breast implant right.           Medication Instructions:   - ACE/ARB: HOLD due to exceptional risk of hypotension during surgery.    - pregabalin, gabapentin: Continue without modification.   - ibuprofen (Advil, Motrin): HOLD 1 day before surgery.     RECOMMENDATION:  APPROVAL GIVEN to proceed with proposed procedure, without further diagnostic evaluation.              30  minutes spent on the date of the encounter doing chart review, history and exam, documentation and further activities per the  note        Subjective     HPI related to upcoming procedure: Chronic back pain and fibromyalgia.     1. No - Have you ever had a heart attack or stroke?  2. No - Have you ever had surgery on your heart or blood vessels, such as a stent, coronary (heart) bypass, or surgery on an artery in the head, neck, heart, or legs?  3. No - Do you have chest pain when you are physically active?  4. No - Do you have a history of heart failure?  5. No - Do you currently have a cold, bronchitis, or symptoms of other respiratory (head and chest) infections?  6. No - Do you have a cough, shortness of breath, or wheezing?  7. Yes - Do you or anyone in your family have a history of blood clots? Father, she has not personally had any clots.   8. Yes - Do you or anyone in your family have a serious bleeding problem, such as long-lasting bleeding after surgeries or cuts? Father  9. Yes - Have you ever had anemia or been told to take iron pills? Not currently.   10. No - Have you had any abnormal blood loss such as black, tarry or bloody stools, or abnormal vaginal bleeding?  11. No - Have you ever had a blood transfusion?  12. Yes - Are you willing to have a blood transfusion if it is medically needed before, during, or after your surgery?  13. No - Have you or anyone in your family ever had problems with anesthesia (sedation for surgery)?  14. Yes - Do you have sleep apnea, excessive snoring, or daytime drowsiness? Due to pain and trouble sleeping.  Do you have a CPAP machine? No  15. No - Do you have any artifical heart valves or other implanted medical devices, such as a pacemaker, defibrillator, or continuous glucose monitor?  16. No - Do you have any artifical joints?  17. No - Are you allergic to latex?  18. No - Is there any chance that you may be pregnant?      Health Care Directive:  Patient does not have a Health Care Directive or Living Will: Discussed advance care planning with patient; information given to patient to  review.    Preoperative Review of :   reviewed - no record of controlled substances prescribed.          Review of Systems  Constitutional, neuro, ENT, endocrine, pulmonary, cardiac, gastrointestinal, genitourinary, musculoskeletal, integument and psychiatric systems are negative, except as otherwise noted.    Patient Active Problem List    Diagnosis Date Noted     Age-related osteoporosis without current pathological fracture 04/19/2021     Priority: Medium     Osteoarthritis of spine with radiculopathy, cervical region 10/26/2020     Priority: Medium     Dysplastic nevus 08/22/2019     Priority: Medium     Overview:   Right neck       Chronic pain disorder 10/16/2017     Priority: Medium     Carpal tunnel syndrome of right wrist 02/22/2017     Priority: Medium     Primary osteoarthritis of first carpometacarpal joint of right hand 02/06/2017     Priority: Medium     Raynaud's disease without gangrene 02/06/2017     Priority: Medium     Essential hypertension 10/26/2015     Priority: Medium     Menopause present 06/08/2015     Priority: Medium     Tobacco use disorder 03/31/2014     Priority: Medium     DUB (dysfunctional uterine bleeding) 03/28/2013     Priority: Medium     Depression with anxiety 12/03/2010     Priority: Medium     Fibromyalgia 12/03/2010     Priority: Medium     Dysthymic disorder 12/03/2010     Priority: Medium     Gastroesophageal reflux disease 12/03/2010     Priority: Medium     Anxiety state 04/13/2010     Priority: Medium     Overview:   Epic   Epic       Insomnia 04/13/2010     Priority: Medium     Overview:   Epic        Malignant neoplasm of breast (H) 08/25/2008     Priority: Medium     Excessive or frequent menstruation 07/21/2008     Priority: Medium     Personal history of malignant neoplasm of breast 06/05/2007     Priority: Medium     Overview:   Overview:   right mastectomy  normal left mammogram 5/07  Overview:   right mastectomy  normal left mammogram 5/07       Decreased  libido 06/18/2006     Priority: Medium     Osteoarthritis of spine with radiculopathy, lumbar region 05/25/2006     Priority: Medium     Overview:   Overview:   from neck to spine  Overview:   from neck to spine       Varicose vein of leg 11/07/2005     Priority: Medium     Varicose veins       Hemorrhoids 05/05/2004     Priority: Medium     Overview:   Epic        Abnormal Pap smear of cervix 01/01/2000     Priority: Medium     2000 pt reported abnormal pap with colp        Past Medical History:   Diagnosis Date     Anxiety      Arthritis      Breast CA (H) 2006    right breast cancer     Depressive disorder      Hypertension      Skin cancer      Past Surgical History:   Procedure Laterality Date     APPENDECTOMY       COLONOSCOPY N/A 02/18/2020    normal, repeat 5 years     MASTECTOMY RADICAL Right 2006     Current Outpatient Medications   Medication Sig Dispense Refill     ascorbic acid (VITAMIN C) 500 MG tablet Take 500 mg by mouth daily.       atorvastatin (LIPITOR) 10 MG tablet Take 10 mg by mouth       busPIRone (BUSPAR) 10 MG tablet TAKE 1 TABLET BY MOUTH THREE TIMES DAILY 270 tablet 0     Calcium Carbonate 650 MG TABS Take  by mouth.       cholecalciferol (VITAMIN D) 1000 UNIT tablet Take 1 tablet by mouth daily.       Cranberry 400 MG TABS        Cyanocobalamin (VITAMIN B-12 CR PO)        fish oil-omega-3 fatty acids (FISH OIL) 1000 MG capsule Take 1 g by mouth daily.       Flaxseed, Linseed, 1200 MG CAPS Take  by mouth.       gabapentin (NEURONTIN) 300 MG capsule TAKE 4 CAPSULES BY MOUTH TWICE DAILY 720 capsule 0     gemfibrozil (LOPID) 600 MG tablet TAKE 1 TABLET BY MOUTH TWICE DAILY BEFORE MEAL(S) 180 tablet 1     hydrOXYzine (ATARAX) 25 MG tablet Take 2 tablets (50 mg) by mouth every 6 hours as needed for itching 60 tablet 3     lisinopril-hydrochlorothiazide (ZESTORETIC) 20-25 MG tablet Take 1 tablet by mouth once daily 90 tablet 1     methocarbamol (ROBAXIN) 750 MG tablet TAKE 1 TABLET BY MOUTH  THREE TIMES DAILY 90 tablet 0     Multiple Vitamin (MULTI-VITAMIN) per tablet Take 1 tablet by mouth daily.       omeprazole (PRILOSEC) 20 MG DR capsule Take 1 capsule by mouth once daily 30 capsule 3     order for DME Equipment being ordered: 4 wheeled walker w/seat & brakes - please fax to [facility] [phone] 1 Device 0     oxybutynin (DITROPAN) 5 MG tablet Take 2 tablets by mouth twice daily 360 tablet 2     potassium chloride ER (K-TAB/KLOR-CON) 10 MEQ CR tablet Take 1 tablet (10 mEq) by mouth daily 30 tablet 1     Sennosides ([facility]CA LAX-X PO)        sertraline (ZOLOFT) 100 MG tablet TAKE 1 & 1/2 (ONE & ONE-HALF) TABLETS BY MOUTH ONCE DAILY 135 tablet 0     temazepam (RESTORIL) 30 MG capsule TAKE 1 CAPSULE BY MOUTH NIGHTLY AS NEEDED FOR SLEEP 30 capsule 0     vitamin E (TOCOPHEROL) 100 UNIT capsule Take 100 Units by mouth daily.       alendronate (FOSAMAX) 70 MG tablet Take 1 tablet (70 mg) by mouth every 7 days (Patient not taking: Reported on 5/17/2021) 13 tablet 3     varenicline (CHANTIX CONTINUING MONTH PAK) 1 MG tablet Take 1 tablet (1 mg) by mouth 2 times daily (Patient not taking: Reported on 5/17/2021) 60 tablet 1     varenicline (CHANTIX STARTING MONTH PAK) 0.5 MG X 11 & 1 MG X 42 tablet Use as directed (Patient not taking: Reported on 5/3/2021) 53 tablet 0       Allergies   Allergen Reactions     Amoxicillin      Aspirin      Mild rash     Cephalexin Hives     Flagyl [Metronidazole]      severe hives     Sulfa Drugs Hives     edema        Social History     Tobacco Use     Smoking status: Current Every Day Smoker     Packs/day: 0.50     Years: 37.00     Pack years: 18.50     Smokeless tobacco: Never Used     Tobacco comment: 7-8  a day   Substance Use Topics     Alcohol use: Yes     Alcohol/week: 1.7 standard drinks     Types: 2 Shots of liquor per week     Comment: occ       History   Drug Use No         Objective     /84   Pulse 85   Temp 98.6  F (37  C) (Temporal)    Resp 20   Wt 72.7 kg (160 lb 4 oz)   SpO2 96%   Breastfeeding No   BMI 26.67 kg/m      Physical Exam    GENERAL APPEARANCE: healthy, alert and no distress     EYES: EOMI, PERRL     HENT: ear canals and TM's normal and nose and mouth without ulcers or lesions     NECK: no adenopathy and no asymmetry, masses, or scars     RESP: lungs clear to auscultation - no rales, rhonchi or wheezes     CV: regular rates and rhythm, normal S1 S2, no S3 or S4 and no murmur, click or rub     ABDOMEN: soft, nontender     SKIN: no suspicious lesions or rashes     NEURO: mentation intact and cranial nerves 2-12 intact     PSYCH: mentation appears normal and anxious     LYMPHATICS: No cervical adenopathy    Recent Labs   Lab Test 05/17/21  1317 01/26/21  1525    137   POTASSIUM 3.4 3.1*   CR 0.94 0.96        Diagnostics:  No labs were ordered during this visit.   No EKG required for low risk surgery (cataract, skin procedure, breast biopsy, etc).    Revised Cardiac Risk Index (RCRI):  The patient has the following serious cardiovascular risks for perioperative complications:   - No serious cardiac risks = 0 points     RCRI Interpretation: 0 points: Class I (very low risk - 0.4% complication rate)           Signed Electronically by: Odilon Fuentes NP  Copy of this evaluation report is provided to requesting physician.

## 2021-05-17 NOTE — PATIENT INSTRUCTIONS

## 2021-05-17 NOTE — H&P (VIEW-ONLY)
[address] [address]  Phone: [phone]  Fax: [phone]  Primary Provider: Devin Moscoso  Pre-op Performing Provider: DEVIN MOSCOSO      PREOPERATIVE EVALUATION:  Today's date: 5/17/2021    Nicole Franco is a 60 year old female who presents for a preoperative evaluation.    Surgical Information:  Surgery/Procedure: Epidural steroid injection  Surgery Location: [facility]  Surgeon: Dr. Yen  Surgery Date: 05/21/2021  Time of Surgery: 1:00pm  Where patient plans to recover: At home with family  Fax number for surgical facility: Note does not need to be faxed, will be available electronically in Epic.    Type of Anesthesia Anticipated: Epidural    Assessment & Plan     The proposed surgical procedure is considered LOW risk.    Preop general physical exam  He has had some upset stomach following the fosamax. She had nausea following the medication. No other fevers chills or body aches.   - Chronic conditions are other wise stable.   - She was taking her potassium on an empty stomach and found to have some undigested tablets in her stool. Discussed with pharmacy she will take with food moving forward.   - Potassium in normal range today.     Osteoarthritis of spine with radiculopathy, lumbar region  - Chronic pain from this. The epidural injections have worked well for her in the past.       Possible Sleep Apnea: No      Implanted Device: Breast implant right.           Medication Instructions:   - ACE/ARB: HOLD due to exceptional risk of hypotension during surgery.    - pregabalin, gabapentin: Continue without modification.   - ibuprofen (Advil, Motrin): HOLD 1 day before surgery.     RECOMMENDATION:  APPROVAL GIVEN to proceed with proposed procedure, without further diagnostic evaluation.              30  minutes spent on the date of the encounter doing chart review, history and exam, documentation and further activities per the  note        Subjective     HPI related to upcoming procedure: Chronic back pain and fibromyalgia.     1. No - Have you ever had a heart attack or stroke?  2. No - Have you ever had surgery on your heart or blood vessels, such as a stent, coronary (heart) bypass, or surgery on an artery in the head, neck, heart, or legs?  3. No - Do you have chest pain when you are physically active?  4. No - Do you have a history of heart failure?  5. No - Do you currently have a cold, bronchitis, or symptoms of other respiratory (head and chest) infections?  6. No - Do you have a cough, shortness of breath, or wheezing?  7. Yes - Do you or anyone in your family have a history of blood clots? Father, she has not personally had any clots.   8. Yes - Do you or anyone in your family have a serious bleeding problem, such as long-lasting bleeding after surgeries or cuts? Father  9. Yes - Have you ever had anemia or been told to take iron pills? Not currently.   10. No - Have you had any abnormal blood loss such as black, tarry or bloody stools, or abnormal vaginal bleeding?  11. No - Have you ever had a blood transfusion?  12. Yes - Are you willing to have a blood transfusion if it is medically needed before, during, or after your surgery?  13. No - Have you or anyone in your family ever had problems with anesthesia (sedation for surgery)?  14. Yes - Do you have sleep apnea, excessive snoring, or daytime drowsiness? Due to pain and trouble sleeping.  Do you have a CPAP machine? No  15. No - Do you have any artifical heart valves or other implanted medical devices, such as a pacemaker, defibrillator, or continuous glucose monitor?  16. No - Do you have any artifical joints?  17. No - Are you allergic to latex?  18. No - Is there any chance that you may be pregnant?      Health Care Directive:  Patient does not have a Health Care Directive or Living Will: Discussed advance care planning with patient; information given to patient to  review.    Preoperative Review of :   reviewed - no record of controlled substances prescribed.          Review of Systems  Constitutional, neuro, ENT, endocrine, pulmonary, cardiac, gastrointestinal, genitourinary, musculoskeletal, integument and psychiatric systems are negative, except as otherwise noted.    Patient Active Problem List    Diagnosis Date Noted     Age-related osteoporosis without current pathological fracture 04/19/2021     Priority: Medium     Osteoarthritis of spine with radiculopathy, cervical region 10/26/2020     Priority: Medium     Dysplastic nevus 08/22/2019     Priority: Medium     Overview:   Right neck       Chronic pain disorder 10/16/2017     Priority: Medium     Carpal tunnel syndrome of right wrist 02/22/2017     Priority: Medium     Primary osteoarthritis of first carpometacarpal joint of right hand 02/06/2017     Priority: Medium     Raynaud's disease without gangrene 02/06/2017     Priority: Medium     Essential hypertension 10/26/2015     Priority: Medium     Menopause present 06/08/2015     Priority: Medium     Tobacco use disorder 03/31/2014     Priority: Medium     DUB (dysfunctional uterine bleeding) 03/28/2013     Priority: Medium     Depression with anxiety 12/03/2010     Priority: Medium     Fibromyalgia 12/03/2010     Priority: Medium     Dysthymic disorder 12/03/2010     Priority: Medium     Gastroesophageal reflux disease 12/03/2010     Priority: Medium     Anxiety state 04/13/2010     Priority: Medium     Overview:   Epic   Epic       Insomnia 04/13/2010     Priority: Medium     Overview:   Epic        Malignant neoplasm of breast (H) 08/25/2008     Priority: Medium     Excessive or frequent menstruation 07/21/2008     Priority: Medium     Personal history of malignant neoplasm of breast 06/05/2007     Priority: Medium     Overview:   Overview:   right mastectomy  normal left mammogram 5/07  Overview:   right mastectomy  normal left mammogram 5/07       Decreased  libido 06/18/2006     Priority: Medium     Osteoarthritis of spine with radiculopathy, lumbar region 05/25/2006     Priority: Medium     Overview:   Overview:   from neck to spine  Overview:   from neck to spine       Varicose vein of leg 11/07/2005     Priority: Medium     Varicose veins       Hemorrhoids 05/05/2004     Priority: Medium     Overview:   Epic        Abnormal Pap smear of cervix 01/01/2000     Priority: Medium     2000 pt reported abnormal pap with colp        Past Medical History:   Diagnosis Date     Anxiety      Arthritis      Breast CA (H) 2006    right breast cancer     Depressive disorder      Hypertension      Skin cancer      Past Surgical History:   Procedure Laterality Date     APPENDECTOMY       COLONOSCOPY N/A 02/18/2020    normal, repeat 5 years     MASTECTOMY RADICAL Right 2006     Current Outpatient Medications   Medication Sig Dispense Refill     ascorbic acid (VITAMIN C) 500 MG tablet Take 500 mg by mouth daily.       atorvastatin (LIPITOR) 10 MG tablet Take 10 mg by mouth       busPIRone (BUSPAR) 10 MG tablet TAKE 1 TABLET BY MOUTH THREE TIMES DAILY 270 tablet 0     Calcium Carbonate 650 MG TABS Take  by mouth.       cholecalciferol (VITAMIN D) 1000 UNIT tablet Take 1 tablet by mouth daily.       Cranberry 400 MG TABS        Cyanocobalamin (VITAMIN B-12 CR PO)        fish oil-omega-3 fatty acids (FISH OIL) 1000 MG capsule Take 1 g by mouth daily.       Flaxseed, Linseed, 1200 MG CAPS Take  by mouth.       gabapentin (NEURONTIN) 300 MG capsule TAKE 4 CAPSULES BY MOUTH TWICE DAILY 720 capsule 0     gemfibrozil (LOPID) 600 MG tablet TAKE 1 TABLET BY MOUTH TWICE DAILY BEFORE MEAL(S) 180 tablet 1     hydrOXYzine (ATARAX) 25 MG tablet Take 2 tablets (50 mg) by mouth every 6 hours as needed for itching 60 tablet 3     lisinopril-hydrochlorothiazide (ZESTORETIC) 20-25 MG tablet Take 1 tablet by mouth once daily 90 tablet 1     methocarbamol (ROBAXIN) 750 MG tablet TAKE 1 TABLET BY MOUTH  THREE TIMES DAILY 90 tablet 0     Multiple Vitamin (MULTI-VITAMIN) per tablet Take 1 tablet by mouth daily.       omeprazole (PRILOSEC) 20 MG DR capsule Take 1 capsule by mouth once daily 30 capsule 3     order for DME Equipment being ordered: 4 wheeled walker w/seat & brakes - please fax to [facility] [phone] 1 Device 0     oxybutynin (DITROPAN) 5 MG tablet Take 2 tablets by mouth twice daily 360 tablet 2     potassium chloride ER (K-TAB/KLOR-CON) 10 MEQ CR tablet Take 1 tablet (10 mEq) by mouth daily 30 tablet 1     Sennosides (HCA LAX-X PO)        sertraline (ZOLOFT) 100 MG tablet TAKE 1 & 1/2 (ONE & ONE-HALF) TABLETS BY MOUTH ONCE DAILY 135 tablet 0     temazepam (RESTORIL) 30 MG capsule TAKE 1 CAPSULE BY MOUTH NIGHTLY AS NEEDED FOR SLEEP 30 capsule 0     vitamin E (TOCOPHEROL) 100 UNIT capsule Take 100 Units by mouth daily.       alendronate (FOSAMAX) 70 MG tablet Take 1 tablet (70 mg) by mouth every 7 days (Patient not taking: Reported on 5/17/2021) 13 tablet 3     varenicline (CHANTIX CONTINUING MONTH PAK) 1 MG tablet Take 1 tablet (1 mg) by mouth 2 times daily (Patient not taking: Reported on 5/17/2021) 60 tablet 1     varenicline (CHANTIX STARTING MONTH PAK) 0.5 MG X 11 & 1 MG X 42 tablet Use as directed (Patient not taking: Reported on 5/3/2021) 53 tablet 0       Allergies   Allergen Reactions     Amoxicillin      Aspirin      Mild rash     Cephalexin Hives     Flagyl [Metronidazole]      severe hives     Sulfa Drugs Hives     edema        Social History     Tobacco Use     Smoking status: Current Every Day Smoker     Packs/day: 0.50     Years: 37.00     Pack years: 18.50     Smokeless tobacco: Never Used     Tobacco comment: 7-8  a day   Substance Use Topics     Alcohol use: Yes     Alcohol/week: 1.7 standard drinks     Types: 2 Shots of liquor per week     Comment: occ       History   Drug Use No         Objective     /84   Pulse 85   Temp 98.6  F (37  C) (Temporal)    Resp 20   Wt 72.7 kg (160 lb 4 oz)   SpO2 96%   Breastfeeding No   BMI 26.67 kg/m      Physical Exam    GENERAL APPEARANCE: healthy, alert and no distress     EYES: EOMI, PERRL     HENT: ear canals and TM's normal and nose and mouth without ulcers or lesions     NECK: no adenopathy and no asymmetry, masses, or scars     RESP: lungs clear to auscultation - no rales, rhonchi or wheezes     CV: regular rates and rhythm, normal S1 S2, no S3 or S4 and no murmur, click or rub     ABDOMEN: soft, nontender     SKIN: no suspicious lesions or rashes     NEURO: mentation intact and cranial nerves 2-12 intact     PSYCH: mentation appears normal and anxious     LYMPHATICS: No cervical adenopathy    Recent Labs   Lab Test 05/17/21  1317 01/26/21  1525    137   POTASSIUM 3.4 3.1*   CR 0.94 0.96        Diagnostics:  No labs were ordered during this visit.   No EKG required for low risk surgery (cataract, skin procedure, breast biopsy, etc).    Revised Cardiac Risk Index (RCRI):  The patient has the following serious cardiovascular risks for perioperative complications:   - No serious cardiac risks = 0 points     RCRI Interpretation: 0 points: Class I (very low risk - 0.4% complication rate)           Signed Electronically by: Odilon Fuentes NP  Copy of this evaluation report is provided to requesting physician.

## 2021-05-18 LAB
LABORATORY COMMENT REPORT: NORMAL
SARS-COV-2 RNA RESP QL NAA+PROBE: NEGATIVE
SPECIMEN SOURCE: NORMAL

## 2021-05-20 DIAGNOSIS — Z11.59 ENCOUNTER FOR SCREENING FOR OTHER VIRAL DISEASES: ICD-10-CM

## 2021-05-21 ENCOUNTER — TELEPHONE (OUTPATIENT)
Dept: FAMILY MEDICINE | Facility: CLINIC | Age: 61
End: 2021-05-21

## 2021-05-21 DIAGNOSIS — M47.26 OSTEOARTHRITIS OF SPINE WITH RADICULOPATHY, LUMBAR REGION: Primary | ICD-10-CM

## 2021-05-21 RX ORDER — HYDROCODONE BITARTRATE AND ACETAMINOPHEN 10; 325 MG/1; MG/1
1 TABLET ORAL AT BEDTIME
Qty: 14 TABLET | Refills: 0 | Status: SHIPPED | OUTPATIENT
Start: 2021-05-21 | End: 2021-05-25

## 2021-05-21 NOTE — TELEPHONE ENCOUNTER
I called patient back.     She would like a pain medication to help with sleep until her back injection in 2 weeks, this was pushed out due to insurance.     We reviewed medications and she is taking the Gabapentin and the Methocarbamol as directed.     She does okay during the day but her sleep is severely interrupted. She is exhausted.     I will give her a short course of Norco to get her through to the injection. She knows this will not be appropriate as a daily medication for her. She will only use at bed time as needed for severe pain.     Odilon Fuentes CNP

## 2021-05-21 NOTE — TELEPHONE ENCOUNTER
Reason for Call:  Other     Detailed comments: Insurance cancelled her injection because it was to soon after her last injection. She is rescheduled for June 4,2021  Requesting pain meds  [facility]    Phone Number Patient can be reached at: Cell number on file:    Telephone Information:   Mobile [phone]   Mobile [phone]       Best Time:     Can we leave a detailed message on this number? YES    Call taken on 5/21/2021 at 12:28 PM by Ethel Ríos

## 2021-05-24 ENCOUNTER — TELEPHONE (OUTPATIENT)
Dept: FAMILY MEDICINE | Facility: CLINIC | Age: 61
End: 2021-05-24

## 2021-05-24 DIAGNOSIS — M47.26 OSTEOARTHRITIS OF SPINE WITH RADICULOPATHY, LUMBAR REGION: ICD-10-CM

## 2021-05-24 NOTE — TELEPHONE ENCOUNTER
Hydrocodone       Last Written Prescription Date:  5/21/2021  Last Fill Quantity: 14,   # refills: 0  Last Office Visit: 5/17/2021  Future Office visit:       Routing refill request to provider for review/approval because:  Drug not on the [facility], [facility] or [facility] refill protocol or controlled substance    Per pharmacy, patient was only given 7 tablets, needs script for 7 more

## 2021-05-25 RX ORDER — HYDROCODONE BITARTRATE AND ACETAMINOPHEN 10; 325 MG/1; MG/1
1 TABLET ORAL AT BEDTIME
Qty: 14 TABLET | Refills: 0 | Status: SHIPPED | OUTPATIENT
Start: 2021-05-25

## 2021-05-25 NOTE — TELEPHONE ENCOUNTER
This is a short course of treatment. Needs to last until 6/4/21 until her procedure. No further refills.

## 2021-05-26 NOTE — TELEPHONE ENCOUNTER
Patient was informed that this is a short course of treatment, needs to last until 6/4/2021 until a procedure. No further refills.     Patient states she only received #7. Patient was informed that she would have to call her insurance provider or the pharmacy.    Farshad Kendrick, [facility]

## 2021-06-01 DIAGNOSIS — F41.8 DEPRESSION WITH ANXIETY: ICD-10-CM

## 2021-06-01 DIAGNOSIS — E87.6 HYPOKALEMIA: ICD-10-CM

## 2021-06-01 DIAGNOSIS — M79.7 FIBROMYALGIA: ICD-10-CM

## 2021-06-01 DIAGNOSIS — F51.01 PRIMARY INSOMNIA: ICD-10-CM

## 2021-06-01 RX ORDER — METHOCARBAMOL 750 MG/1
TABLET, FILM COATED ORAL
Qty: 90 TABLET | Refills: 0 | Status: SHIPPED | OUTPATIENT
Start: 2021-06-01 | End: 2021-07-30

## 2021-06-01 NOTE — TELEPHONE ENCOUNTER
Requested Prescriptions   Pending Prescriptions Disp Refills     methocarbamol (ROBAXIN) 750 MG tablet [Pharmacy Med Name: Methocarbamol 750 MG Oral Tablet] 90 tablet 0     Sig: TAKE 1 TABLET BY MOUTH THREE TIMES DAILY     Last Written Prescription Date:  05/03/2021  Last Fill Quantity: 90,   # refills: 0  Last Office Visit: 05/17/2021  Future Office visit:    Next 5 appointments (look out 90 days)    Jun 02, 2021 10:50 AM  Pre-procedure Covid with [facility] COVID LAB  [facility] ([facility] ) [address] [place] [address] [id number] [phone]           Routing refill request to provider for review/approval because:  Drug not on the [facility], [facility] or [facility] refill protocol or controlled substance    Keya Gutierrez MA

## 2021-06-02 DIAGNOSIS — Z11.59 ENCOUNTER FOR SCREENING FOR OTHER VIRAL DISEASES: ICD-10-CM

## 2021-06-02 LAB
SARS-COV-2 RNA RESP QL NAA+PROBE: NORMAL
SPECIMEN SOURCE: NORMAL

## 2021-06-02 PROCEDURE — U0003 INFECTIOUS AGENT DETECTION BY NUCLEIC ACID (DNA OR RNA); SEVERE ACUTE RESPIRATORY SYNDROME CORONAVIRUS 2 (SARS-COV-2) (CORONAVIRUS DISEASE [COVID-19]), AMPLIFIED PROBE TECHNIQUE, MAKING USE OF HIGH THROUGHPUT TECHNOLOGIES AS DESCRIBED BY CMS-2020-01-R: HCPCS | Performed by: ANESTHESIOLOGY

## 2021-06-02 PROCEDURE — U0005 INFEC AGEN DETEC AMPLI PROBE: HCPCS | Performed by: ANESTHESIOLOGY

## 2021-06-02 RX ORDER — POTASSIUM CHLORIDE 750 MG/1
TABLET, EXTENDED RELEASE ORAL
Qty: 30 TABLET | Refills: 2 | Status: SHIPPED | OUTPATIENT
Start: 2021-06-02 | End: 2021-09-20

## 2021-06-02 NOTE — TELEPHONE ENCOUNTER
"  Requested Prescriptions   Pending Prescriptions Disp Refills     potassium chloride ER (K-TAB/KLOR-CON) 10 MEQ CR tablet [Pharmacy Med Name: Potassium Chloride ER 10 MEQ Oral Tablet Extended Release] 30 tablet 0     Sig: Take 1 tablet by mouth once daily   5/17/2021    Potassium Supplements Protocol Passed - 6/1/2021 12:18 PM        Passed - Recent (12 mo) or future (30 days) visit within the authorizing provider's department     Patient has had an office visit with the authorizing provider or a provider within the authorizing providers department within the previous 12 mos or has a future within next 30 days. See \"Patient Info\" tab in inbasket, or \"Choose Columns\" in Meds & Orders section of the refill encounter.              Passed - Medication is active on med list        Passed - Patient is age 18 or older        Passed - Normal serum potassium in past 12 months     Recent Labs   Lab Test 05/17/21  1317   POTASSIUM 3.4                     Prescription approved per [facility] Refill Protocol.  Kacey Odonnell RN    "

## 2021-06-03 ENCOUNTER — ANESTHESIA EVENT (OUTPATIENT)
Dept: SURGERY | Facility: CLINIC | Age: 61
End: 2021-06-03
Payer: COMMERCIAL

## 2021-06-03 RX ORDER — SERTRALINE HYDROCHLORIDE 100 MG/1
TABLET, FILM COATED ORAL
Qty: 135 TABLET | Refills: 0 | Status: SHIPPED | OUTPATIENT
Start: 2021-06-03

## 2021-06-03 RX ORDER — TEMAZEPAM 30 MG
CAPSULE ORAL
Qty: 30 CAPSULE | Refills: 0 | Status: SHIPPED | OUTPATIENT
Start: 2021-06-03 | End: 2021-07-06

## 2021-06-03 ASSESSMENT — LIFESTYLE VARIABLES: TOBACCO_USE: 1

## 2021-06-03 NOTE — ANESTHESIA PREPROCEDURE EVALUATION
Anesthesia Pre-Procedure Evaluation    Patient: Nicole Franco   MRN: [id number] : 1960        Preoperative Diagnosis: Lumbar radiculopathy [M54.16]   Procedure : Procedure(s):  Left transformainal epidural steroid injection lumbar 4-5, lumbar 5-Sacral 1     Past Medical History:   Diagnosis Date     Anxiety      Arthritis      Breast CA (H) 2006    right breast cancer     Depressive disorder      Hypertension      Skin cancer       Past Surgical History:   Procedure Laterality Date     APPENDECTOMY       COLONOSCOPY N/A 2020    normal, repeat 5 years     MASTECTOMY RADICAL Right 2006      Allergies   Allergen Reactions     Amoxicillin      Aspirin      Mild rash     Cephalexin Hives     Flagyl [Metronidazole]      severe hives     Sulfa Drugs Hives     edema      Social History     Tobacco Use     Smoking status: Current Every Day Smoker     Packs/day: 0.50     Years: 37.00     Pack years: 18.50     Smokeless tobacco: Never Used     Tobacco comment: 7-8  a day   Substance Use Topics     Alcohol use: Yes     Alcohol/week: 1.7 standard drinks     Types: 2 Shots of liquor per week     Comment: occ      Wt Readings from Last 1 Encounters:   21 72.7 kg (160 lb 4 oz)        Anesthesia Evaluation   Pt has had prior anesthetic. Type: MAC and General.        ROS/MED HX  ENT/Pulmonary:     (+) tobacco use, Current use,     Neurologic:  - neg neurologic ROS     Cardiovascular:     (+) hypertension-----    METS/Exercise Tolerance:     Hematologic:  - neg hematologic  ROS     Musculoskeletal: Comment: Lumbar DDD  Osteoarthritis of spine with radiculopathy, lumbar region  Osteoarthritis of spine with radiculopathy, cervical region  Raynaud's disease   Fibromyalgia      GI/Hepatic:     (+) GERD, Asymptomatic on medication,     Renal/Genitourinary:  - neg Renal ROS     Endo:  - neg endo ROS     Psychiatric/Substance Use:     (+) psychiatric history anxiety and depression     Infectious Disease:  - neg  infectious disease ROS     Malignancy:  - neg malignancy ROS (+) Malignancy, History of Breast.Breast CA Remission status post Surgery, Chemo and Radiation.        Other:  - neg other ROS    (+) , H/O Chronic Pain,        Physical Exam    Airway  airway exam normal      Mallampati: II   TM distance: > 3 FB   Neck ROM: full   Mouth opening: > 3 cm    Respiratory Devices and Support         Dental  no notable dental history         Cardiovascular   cardiovascular exam normal       Rhythm and rate: regular and normal     Pulmonary   pulmonary exam normal        breath sounds clear to auscultation           OUTSIDE LABS:  CBC:   Lab Results   Component Value Date    WBC 10.5 11/06/2015    WBC 6.2 01/23/2013    HGB 15.4 11/06/2015    HGB 15.1 01/23/2013    HCT 43.9 11/06/2015    HCT 44.1 01/23/2013     11/06/2015     01/23/2013     BMP:   Lab Results   Component Value Date     05/17/2021     01/26/2021    POTASSIUM 3.4 05/17/2021    POTASSIUM 3.1 (L) 01/26/2021    CHLORIDE 104 05/17/2021    CHLORIDE 100 01/26/2021    CO2 25 05/17/2021    CO2 31 01/26/2021    BUN 15 05/17/2021    BUN 19 01/26/2021    CR 0.94 05/17/2021    CR 0.96 01/26/2021    GLC 98 05/17/2021    GLC 76 01/26/2021     COAGS: No results found for: PTT, INR, FIBR  POC: No results found for: BGM, HCG, HCGS  HEPATIC:   Lab Results   Component Value Date    ALBUMIN 4.2 01/26/2021    PROTTOTAL 7.9 01/26/2021    ALT 22 01/26/2021    AST 28 01/26/2021    ALKPHOS 112 01/26/2021    BILITOTAL 0.5 01/26/2021     OTHER:   Lab Results   Component Value Date    HEIDI 9.7 05/17/2021    TSH 1.47 06/18/2011    CRP <5.0 06/18/2011    SED 20 10/08/2012       Anesthesia Plan    ASA Status:  2   NPO Status:  NPO Appropriate    Anesthesia Type: MAC.     - Reason for MAC: straight local not clinically adequate   Induction: Intravenous, Propofol.   Maintenance: TIVA.        Consents    Anesthesia Plan(s) and associated risks, benefits, and realistic  alternatives discussed. Questions answered and patient/representative(s) expressed understanding.     - Discussed with:  Patient      - Extended Intubation/Ventilatory Support Discussed: No.      - Patient is DNR/DNI Status: No    Use of blood products discussed: No .     Postoperative Care    Pain management: IV analgesics.   PONV prophylaxis: Ondansetron (or other 5HT-3), Dexamethasone or Solumedrol     Comments:    The risks and benefits of anesthesia, and the alternatives where applicable, have been discussed with the patient, and they wish to proceed.            BULL Hernandez CRNA

## 2021-06-03 NOTE — TELEPHONE ENCOUNTER
Temazepam      Last Written Prescription Date:  5/10/2021  Last Fill Quantity: 30,   # refills: 0  Last Office Visit: 5/17/2021  Future Office visit:       Routing refill request to provider for review/approval because:  Drug not on the [facility], [facility] or [facility] refill protocol or controlled substance    Routing refill request to provider for review/approval because:  PHQ-9 >4 (score of 10)    GET PinedaN, RN  [facility]

## 2021-06-04 ENCOUNTER — HOSPITAL ENCOUNTER (OUTPATIENT)
Facility: CLINIC | Age: 61
Discharge: HOME | End: 2021-06-04
Attending: ANESTHESIOLOGY | Admitting: ANESTHESIOLOGY
Payer: COMMERCIAL

## 2021-06-04 ENCOUNTER — ANESTHESIA (OUTPATIENT)
Dept: SURGERY | Facility: CLINIC | Age: 61
End: 2021-06-04
Payer: COMMERCIAL

## 2021-06-04 ENCOUNTER — HOSPITAL ENCOUNTER (OUTPATIENT)
Dept: GENERAL RADIOLOGY | Facility: CLINIC | Age: 61
End: 2021-06-04
Attending: ANESTHESIOLOGY | Admitting: ANESTHESIOLOGY
Payer: COMMERCIAL

## 2021-06-04 VITALS
DIASTOLIC BLOOD PRESSURE: 74 MMHG | HEART RATE: 53 BPM | TEMPERATURE: 98.2 F | SYSTOLIC BLOOD PRESSURE: 120 MMHG | OXYGEN SATURATION: 96 % | RESPIRATION RATE: 16 BRPM

## 2021-06-04 DIAGNOSIS — M54.16 LUMBAR RADICULOPATHY: ICD-10-CM

## 2021-06-04 PROCEDURE — 370N000017 HC ANESTHESIA TECHNICAL FEE, PER MIN: Performed by: ANESTHESIOLOGY

## 2021-06-04 PROCEDURE — 64484 NJX AA&/STRD TFRM EPI L/S EA: CPT | Mod: LT | Performed by: ANESTHESIOLOGY

## 2021-06-04 PROCEDURE — 64483 NJX AA&/STRD TFRM EPI L/S 1: CPT | Performed by: ANESTHESIOLOGY

## 2021-06-04 PROCEDURE — 999N000179 XR SURGERY CARM FLUORO LESS THAN 5 MIN W STILLS: Mod: TC

## 2021-06-04 PROCEDURE — 250N000011 HC RX IP 250 OP 636: Performed by: ANESTHESIOLOGY

## 2021-06-04 PROCEDURE — 64483 NJX AA&/STRD TFRM EPI L/S 1: CPT | Mod: LT | Performed by: ANESTHESIOLOGY

## 2021-06-04 PROCEDURE — 250N000009 HC RX 250: Performed by: NURSE ANESTHETIST, CERTIFIED REGISTERED

## 2021-06-04 PROCEDURE — 250N000011 HC RX IP 250 OP 636: Performed by: NURSE ANESTHETIST, CERTIFIED REGISTERED

## 2021-06-04 RX ORDER — LIDOCAINE HYDROCHLORIDE 20 MG/ML
INJECTION, SOLUTION INFILTRATION; PERINEURAL PRN
Status: DISCONTINUED | OUTPATIENT
Start: 2021-06-04 | End: 2021-06-04

## 2021-06-04 RX ORDER — IOPAMIDOL 612 MG/ML
INJECTION, SOLUTION INTRATHECAL PRN
Status: DISCONTINUED | OUTPATIENT
Start: 2021-06-04 | End: 2021-06-04 | Stop reason: HOSPADM

## 2021-06-04 RX ORDER — LIDOCAINE 40 MG/G
CREAM TOPICAL
Status: DISCONTINUED | OUTPATIENT
Start: 2021-06-04 | End: 2021-06-04 | Stop reason: HOSPADM

## 2021-06-04 RX ORDER — METHYLPREDNISOLONE ACETATE 40 MG/ML
INJECTION, SUSPENSION INTRA-ARTICULAR; INTRALESIONAL; INTRAMUSCULAR; SOFT TISSUE PRN
Status: DISCONTINUED | OUTPATIENT
Start: 2021-06-04 | End: 2021-06-04 | Stop reason: HOSPADM

## 2021-06-04 RX ORDER — PROPOFOL 10 MG/ML
INJECTION, EMULSION INTRAVENOUS PRN
Status: DISCONTINUED | OUTPATIENT
Start: 2021-06-04 | End: 2021-06-04

## 2021-06-04 RX ADMIN — LIDOCAINE HYDROCHLORIDE 60 MG: 20 INJECTION, SOLUTION INFILTRATION; PERINEURAL at 12:48

## 2021-06-04 RX ADMIN — PROPOFOL 40 MG: 10 INJECTION, EMULSION INTRAVENOUS at 12:54

## 2021-06-04 RX ADMIN — PROPOFOL 130 MG: 10 INJECTION, EMULSION INTRAVENOUS at 12:50

## 2021-06-04 NOTE — DISCHARGE INSTRUCTIONS
Home Care Instructions                Procedure: Epidural injection or joint injection    Activity:    Rest today    Do not work today    Resume normal activity tomorrow    Pain:    You may experience soreness at the injection site for 1 to 3 days.    You may use an ice pack for 20 minutes every 2 hours for the first 24 hours    You may use a heating pad after the first 24 hours    You may use Tylenol  (acetaminophen) every 4 hours or other pain medicines as directed by your physician    Safety  Sedation medicine, if given may remain active for many hours.    It is important for the next 24 hours that you do not:    Drive a car    Operate machines or power tools    Consume alcohol, including beer    Sign any important papers or legal documents    You may experience numbness radiating into your legs or arms, (depending on the procedure location)  This numbness may last several hours.  Until the numb sensation returns to normal please use caution in walking, climbing stairs, stepping out of your vehicle, etc.    Common side effects of steroids:  Not everyone will experience corticosteroid side effects. If side effects are experienced they will gradually subside in the 7-10 day period following an injection.    Most common side effects include:    Flushed face and/or chest    Feeling of warmth, particularly in face but could be overall feeling of warmth    Increased blood sugar in diabetic patients    Menstrual irregularities may occur.  If taking hormone based birth control an alternate method of birth control is recommended    Sleep disturbances and/or mood swings are possible    Leg cramps    Please contact us if you have:  Severe pain   Fever more than 101.5 degrees Fahrenheit  Signs of infection (redness, swelling or drainage)      If you have questions during normal business hours (8am-5pm Monday-Friday) contact the [facility] at [phone]. If you need help after hours, we recommend that you go to a  hospital emergency room or dial 911.

## 2021-06-04 NOTE — ANESTHESIA POSTPROCEDURE EVALUATION
Patient: Nicole Franco    Procedure(s):  Left  lumbar 4-5, and left lumbar 5-Sacral 1 epidural injection    Diagnosis:Lumbar radiculopathy [M54.16]  Diagnosis Additional Information: No value filed.    Anesthesia Type:  MAC    Note:  Disposition: Outpatient   Postop Pain Control: Uneventful            Sign Out: Well controlled pain   PONV: No   Neuro/Psych: Uneventful            Sign Out: Acceptable/Baseline neuro status   Airway/Respiratory: Uneventful            Sign Out: Acceptable/Baseline resp. status   CV/Hemodynamics: Uneventful            Sign Out: Acceptable CV status   Other NRE: NONE   DID A NON-ROUTINE EVENT OCCUR? No    Event details/Postop Comments:  Pt was happy with anesthesia care.  No complications.  I will follow up with the pt if needed.           Last vitals:  Vitals:    06/04/21 1138 06/04/21 1259   BP: 105/64 113/61   Pulse:  55   Resp: 19 16   Temp: 98.2  F (36.8  C)    SpO2:  99%       Last vitals prior to Anesthesia Care Transfer:  CRNA VITALS  6/4/2021 1228 - 6/4/2021 1313      6/4/2021             Pulse:  63    SpO2:  98 %    Resp Rate (observed):  11          Electronically Signed By: BULL Hernandez CRNA  June 4, 2021  1:13 PM

## 2021-06-04 NOTE — OP NOTE
PRIMARY PROBLEM: Low back pain and left leg pain    PROCEDURE: Left L4-5 and L5-S1  Transforaminal Epidural Steroid Injections with fluoroscopic guidance and contrast.     PROCEDURE DETAILS: After written informed consent was obtained from the patient, the patient was escorted to the procedure room.  The patient was placed in the prone position.  A  time out  was conducted to verify patient identity, procedure to be performed, side, site, allergies and any special requirements.  The skin over the thoracolumbar region was prepped and draped in normal sterile fashion. Fluoroscopy was used to identify the neural foramen in AP view and the skin was anesthetized with 2 mL of 1% lidocaine with bicarbonate buffer. A 22-gauge Quincke spinal needle was advanced through this location and advanced under fluoroscopic guidance towards the neural foramen.  The target zone was the 6 o clock position of the pedicle.   Prior to entering the foramen, the depth of the needle was gauged with a lateral view on fluoroscopy. While still in a lateral view, the needle was slowly advanced to avoid injury to the spinal nerve.  Then, in the oblique view (approximately 28 degrees), after negative aspiration, 1.5 mL of Omnipaque contrast dye was injected revealing epidural spread without evidence of intravascular or intrathecal spread.  Then a 3cc solution of 20 mg of Depo-Medrol in 2 mL of  Preservative-Free saline was slowly injected into the epidural space at each segment.  There is very good spread at L4-5 and L5-S1 centrally as well as the foraminal locations.  After injection of the medication, as the needle tip was withdrawn, it was flushed with local anesthetic.   The patient was monitored with blood pressure and pulse oximetry machines with the assistance of an RN throughout the procedure.  The patient was alert and responsive to questions throughout the procedure.   The patient tolerated the procedure well and was observed in the  post-procedural area.  The patient was dismissed without apparent complications.     BLOOD LOSS: < 5 cc    DIAGNOSIS:  1.  History of longer-term benefits from a previous epidural injection performed at the left L4-5 and L5-S1 level secondary to a left lumbar radiculopathy    PLAN:  1. Performed L4-5 and L5-S1  transforaminal epidural steroid injections.  2. The patient was instructed to follow-up per Dr. Yen's instructions.      Landry Yen MD  Diplomate of the American Board of Anesthesiology, Pain Medicine

## 2021-06-27 DIAGNOSIS — F41.8 DEPRESSION WITH ANXIETY: ICD-10-CM

## 2021-06-28 RX ORDER — HYDROXYZINE HYDROCHLORIDE 25 MG/1
TABLET, FILM COATED ORAL
Qty: 60 TABLET | Refills: 3 | Status: SHIPPED | OUTPATIENT
Start: 2021-06-28 | End: 2021-09-20

## 2021-06-28 NOTE — TELEPHONE ENCOUNTER
"  Requested Prescriptions   Pending Prescriptions Disp Refills     hydrOXYzine (ATARAX) 25 MG tablet [Pharmacy Med Name: hydrOXYzine HCl 25 MG Oral Tablet] 60 tablet 0     Sig: TAKE 2 TABLETS BY MOUTH EVERY 6 HOURS AS NEEDED FOR ITCHING   5/17/2021    Antihistamines Protocol Passed - 6/27/2021  6:30 AM        Passed - Recent (12 mo) or future (30 days) visit within the authorizing provider's specialty     Patient has had an office visit with the authorizing provider or a provider within the authorizing providers department within the previous 12 mos or has a future within next 30 days. See \"Patient Info\" tab in inbasket, or \"Choose Columns\" in Meds & Orders section of the refill encounter.              Passed - Patient is age 3 or older     Apply age and/or weight-based dosing for peds patients age 3 and older.    Forward request to provider for patients under the age of 3.          Passed - Medication is active on med list         Prescription approved per [facility] Refill Protocol.  Kacey Odonnell RN      "

## 2021-07-03 DIAGNOSIS — F51.01 PRIMARY INSOMNIA: ICD-10-CM

## 2021-07-05 NOTE — TELEPHONE ENCOUNTER
Restoril      Last Written Prescription Date:  06/03/2021  Last Fill Quantity: 30,   # refills: 0  Last Office Visit: 05/17/2021  Future Office visit:   None  Routing refill request to provider for review/approval because:  Drug not on the [facility], [facility] or [facility] refill protocol or controlled substance    Kristin Diaz RN

## 2021-07-06 RX ORDER — TEMAZEPAM 30 MG
CAPSULE ORAL
Qty: 60 CAPSULE | Refills: 0 | Status: SHIPPED | OUTPATIENT
Start: 2021-07-06 | End: 2021-09-01

## 2021-07-27 DIAGNOSIS — M79.7 FIBROMYALGIA: ICD-10-CM

## 2021-07-27 DIAGNOSIS — E78.5 HYPERLIPIDEMIA LDL GOAL <100: ICD-10-CM

## 2021-07-30 RX ORDER — GEMFIBROZIL 600 MG/1
TABLET, FILM COATED ORAL
Qty: 180 TABLET | Refills: 0 | Status: SHIPPED | OUTPATIENT
Start: 2021-07-30

## 2021-07-30 RX ORDER — METHOCARBAMOL 750 MG/1
TABLET, FILM COATED ORAL
Qty: 90 TABLET | Refills: 0 | Status: SHIPPED | OUTPATIENT
Start: 2021-07-30

## 2021-07-30 NOTE — TELEPHONE ENCOUNTER
"Requested Prescriptions   Pending Prescriptions Disp Refills    gemfibrozil (LOPID) 600 MG tablet [Pharmacy Med Name: Gemfibrozil 600 MG Oral Tablet] 180 tablet 0     Sig: TAKE 1 TABLET BY MOUTH TWICE DAILY BEFORE MEAL(S)        Fibrates Passed - 7/27/2021 10:40 AM        Passed - Lipid panel on file in past 12 months       Recent Labs   Lab Test 01/26/21  1525   CHOL 187   TRIG 114   HDL 43*   *   NHDL 144*               Passed - No abnormal creatine kinase in past 12 months     No lab results found.             Passed - Recent (12 mo) or future (30 days) visit within the authorizing provider's specialty     Patient has had an office visit with the authorizing provider or a provider within the authorizing providers department within the previous 12 mos or has a future within next 30 days. See \"Patient Info\" tab in inbasket, or \"Choose Columns\" in Meds & Orders section of the refill encounter.              Passed - Medication is active on med list        Passed - Patient is age 18 or older        Passed - No active pregnancy on record        Passed - No positive pregnancy test in past 12 months           methocarbamol (ROBAXIN) 750 MG tablet [Pharmacy Med Name: Methocarbamol 750 MG Oral Tablet] 90 tablet 0     Sig: TAKE 1 TABLET BY MOUTH THREE TIMES DAILY        There is no refill protocol information for this order         Last Written Prescription Date:  methocarbamol 6/1/2021  Last Fill Quantity: 90,  # refills: 0   Last office visit: 5/17/2021 with prescribing provider:  Alfredo    Future Office Visit:          Routing refill request to provider for review/approval because:  Drug not on the [facility] [place]   Drug interaction warning - VERY HIGH with Atorvastatin      Marlys Lerma RN  [facility]                 "

## 2021-08-18 DIAGNOSIS — G89.29 OTHER CHRONIC PAIN: ICD-10-CM

## 2021-08-18 RX ORDER — GABAPENTIN 300 MG/1
CAPSULE ORAL
Qty: 720 CAPSULE | Refills: 0 | Status: SHIPPED | OUTPATIENT
Start: 2021-08-18

## 2021-08-18 NOTE — TELEPHONE ENCOUNTER
Requested Prescriptions   Pending Prescriptions Disp Refills     gabapentin (NEURONTIN) 300 MG capsule [Pharmacy Med Name: GABAPENTIN 300MG    CAP] 720 capsule 0     Sig: TAKE 4 CAPSULES BY MOUTH TWICE DAILY       Last Written Prescription Date:  04/14/2021  Last Fill Quantity: 720,   # refills: 0  Last Office Visit: 05/17/2021  Future Office visit:       Routing refill request to provider for review/approval because:  Drug not on the [facility] or [facility] refill protocol or controlled substance

## 2021-08-31 DIAGNOSIS — F41.8 DEPRESSION WITH ANXIETY: ICD-10-CM

## 2021-08-31 DIAGNOSIS — F51.01 PRIMARY INSOMNIA: ICD-10-CM

## 2021-08-31 RX ORDER — HYDROXYZINE HYDROCHLORIDE 25 MG/1
TABLET, FILM COATED ORAL
Qty: 60 TABLET | Refills: 3 | Status: CANCELLED | OUTPATIENT
Start: 2021-08-31

## 2021-08-31 RX ORDER — TEMAZEPAM 30 MG
30 CAPSULE ORAL
Qty: 60 CAPSULE | Refills: 0 | Status: CANCELLED | OUTPATIENT
Start: 2021-08-31

## 2021-08-31 RX ORDER — SERTRALINE HYDROCHLORIDE 100 MG/1
TABLET, FILM COATED ORAL
Qty: 135 TABLET | Refills: 0 | Status: CANCELLED | OUTPATIENT
Start: 2021-08-31

## 2021-09-01 DIAGNOSIS — F51.01 PRIMARY INSOMNIA: ICD-10-CM

## 2021-09-01 RX ORDER — TEMAZEPAM 30 MG
CAPSULE ORAL
Qty: 60 CAPSULE | Refills: 0 | Status: SHIPPED | OUTPATIENT
Start: 2021-09-01

## 2021-09-01 NOTE — TELEPHONE ENCOUNTER
Patient calling on status of refill request and informed we have received and has been routed. Patient stating she is in the process of moving and will be establishing care with another provider for further ongoing refills of her medication. Francesca Ortez LPN

## 2021-09-01 NOTE — TELEPHONE ENCOUNTER
Pending Prescriptions:                       Disp   Refills    busPIRone (BUSPAR) 10 MG tablet            270 ta*0        Sig: TAKE 1 TABLET BY MOUTH THREE TIMES DAILY    Routing refill request to provider for review/approval because:  Labs out of range:    PHQ 9/15/2020 10/5/2020 1/26/2021   PHQ-9 Total Score 10 - 10   Q9: Thoughts of better off dead/self-harm past 2 weeks Not at all - Not at all   PHQ-9 External Data - 12 -

## 2021-09-01 NOTE — TELEPHONE ENCOUNTER
Patient calling on the status of her request. Patient stating she is in the process of moving and will be establishing care with another provider. Needing med filled now and will establish before her next fill. Francesca Ortez LPN    TEMAZEPAM      Last Written Prescription Date:  7/6/21  Last Fill Quantity: 60,   # refills: 0  Last Office Visit: 5/17/21  Future Office visit:       Routing refill request to provider for review/approval because:  Drug not on the [facility], [facility] or [facility] refill protocol or controlled substance

## 2021-09-04 ENCOUNTER — HEALTH MAINTENANCE LETTER (OUTPATIENT)
Age: 61
End: 2021-09-04

## 2021-09-07 RX ORDER — BUSPIRONE HYDROCHLORIDE 10 MG/1
TABLET ORAL
Qty: 270 TABLET | Refills: 1 | Status: SHIPPED | OUTPATIENT
Start: 2021-09-07

## 2022-04-16 ENCOUNTER — HEALTH MAINTENANCE LETTER (OUTPATIENT)
Age: 62
End: 2022-04-16

## 2022-10-22 ENCOUNTER — HEALTH MAINTENANCE LETTER (OUTPATIENT)
Age: 62
End: 2022-10-22

## 2022-12-05 NOTE — PROGRESS NOTES
Scheduled Follow Up Call: Attempt 1 Community Health Worker called and left a message for the patient. If the patient is returning my call, please transfer the patient to Yen HERNANDEZ at [phone].        Notes:  Patient is in temporary home in [place]  Try to get Social Security Disability  Help paying for medications      
The patient is a 73y Female complaining of back pain general.

## 2023-04-01 ENCOUNTER — HEALTH MAINTENANCE LETTER (OUTPATIENT)
Age: 63
End: 2023-04-01

## 2023-06-01 ENCOUNTER — HEALTH MAINTENANCE LETTER (OUTPATIENT)
Age: 63
End: 2023-06-01

## 2024-06-02 ENCOUNTER — HEALTH MAINTENANCE LETTER (OUTPATIENT)
Age: 64
End: 2024-06-02

## 2024-12-18 NOTE — TELEPHONE ENCOUNTER
Patient returning call and wants a a call back. If patient doesn't answer call its fine to leave a detailed message.    Fall Risk

## (undated) DEVICE — SYR 05ML LL W/O NDL

## (undated) DEVICE — GLOVE PROTEXIS W/NEU-THERA 7.5  2D73TE75

## (undated) DEVICE — PREP CHLORAPREP 26ML TINTED ORANGE  260815

## (undated) DEVICE — SPINOCAN

## (undated) DEVICE — SYR 10ML LL W/O NDL

## (undated) DEVICE — TUBING IV EXTENSION SET 34"

## (undated) DEVICE — TRAY PROCEDURE SUPPORT PAIN MANAGEMENT 332114